# Patient Record
Sex: MALE | Race: WHITE | NOT HISPANIC OR LATINO | Employment: OTHER | URBAN - METROPOLITAN AREA
[De-identification: names, ages, dates, MRNs, and addresses within clinical notes are randomized per-mention and may not be internally consistent; named-entity substitution may affect disease eponyms.]

---

## 2017-05-30 PROBLEM — D53.9 MACROCYTIC ANEMIA: Status: ACTIVE | Noted: 2017-05-30

## 2020-07-19 ENCOUNTER — DOCUMENTATION ONLY (OUTPATIENT)
Dept: TELEMEDICINE | Facility: HOSPITAL | Age: 65
End: 2020-07-19

## 2020-07-19 NOTE — PLAN OF CARE
(Physician in Lead of Transfers)   Outside Transfer Acceptance Note / Regional Referral Center    Transferring Physician: Ashley Vernon NP (GI)    Accepting Physician: Mariluz Moreau MD    Date of Acceptance: 07/19/2020    Transferring Facility: [facility]     Reason for Transfer: Sick patient with decompensated cirrhosis, needs HLOC with hepatology    Report from Transferring Physician/Hospital course: 64M with cirrhosis 2/2 EtOH and Hep C s/p Harvoni (GI: Dr Hernadez), prior EtOH dependence in remission (last drink 10/2019), last EGD 2016 with gastric varices, OA/chronic pain, h/o narcotic abuse on long term methadone 10 BID (per care Everywhere), tobacco smoking, admit to [facility] yesterday afternoon for decompensated cirrhosis and hepatic encephalopathy, and severe KELLI, in setting of diarrhea.      In ED: AMS/encephalopathy, worsening jaundice, Ammonia 212 (normal 19-60), TB 40, BUN 94/Cr 7.18 in patient with no significant renal Hx, K 2.2, INR 1.31, platelets 105, WBC 8.6, H/H ok. Abd U/S with doppler, exhibits normal hepatopedal flow and echogenic liver with nodular contour, no ascites, not enough fluid to tap (GI and radiology discussed). CXR wnl. UA wnl except for bili.  Got 2L IVFs bolus.  Started on lactulose TID, multiple BMs, repeated ammonia today and improved at 209.  GI consult added rifaximin today.  Labs unchanged today. K+ up to 2.8, last Cr improved by 2 points to 5.91. Nephrology and GI consulted.  Diarrhea - Stool Cx and C diff sent and pending.   It is uncertain if the KELLI is due to volume depletion due to profuse diarrhea or hepatorenal syndrome - however, he has already improved after crystalloid IVFs alone which may all be explained by volume depletion    Ongoing diarrhea today - 5 documented stools.  Asked about UOP, and he has had 4 urine episodes (not strict Is/Os).  Just had an episode of vomiting (nonbloody)  MELD-Na 36 (using some labs from today and some from yesterday which  is not ideal albeit we know MELD is high given TB and Cr)    Dr Valles aware of case and will consult.     VS: 139/83, HR 70, RR 17, 99% on RA   PE: +asterixis, arouseable, only oriented x 1    Home meds: see records scanned in to Media tab      Labs: see above    Radiographs: see above    To Do List:   Admit to IML  Consider empiric flagyl for c diff while awaiting outside c diff  Cont empiric CTX    Mariluz Aiken Unknown [phone] (Home) Mother, Emergency Contact       Upon patient arrival to floor, please send SecureChat to [facility] or call extension [phone] (if no answer, this will flip to a beeper, so enter your call back number) for Hospital Medicine admit team assignment and for additional admit orders for the patient.  Do not page the attending physician associated with the patient on arrival (this physician may not be on duty at the time of arrival).  Rather, always call [phone] to reach the triage physician for orders and team assignment.    Mariluz Moreau MD  Hospital Medicine Staff  Cell: [phone]

## 2020-07-20 ENCOUNTER — HOSPITAL ENCOUNTER (INPATIENT)
Facility: HOSPITAL | Age: 65
LOS: 3 days | Discharge: ANOTHER HOSPITAL | DRG: 441 | End: 2020-07-23
Attending: HOSPITALIST | Admitting: HOSPITALIST
Payer: MEDICARE

## 2020-07-20 DIAGNOSIS — Z72.0 TOBACCO ABUSE: ICD-10-CM

## 2020-07-20 DIAGNOSIS — R07.9 CHEST PAIN: ICD-10-CM

## 2020-07-20 DIAGNOSIS — K72.00 ACUTE LIVER FAILURE WITHOUT HEPATIC COMA: ICD-10-CM

## 2020-07-20 DIAGNOSIS — K72.00 ACUTE LIVER FAILURE: ICD-10-CM

## 2020-07-20 DIAGNOSIS — F11.10 NARCOTIC ABUSE: ICD-10-CM

## 2020-07-20 DIAGNOSIS — F10.20 ALCOHOL USE DISORDER, SEVERE, IN CONTROLLED ENVIRONMENT: ICD-10-CM

## 2020-07-20 DIAGNOSIS — N17.0 ACUTE RENAL FAILURE WITH TUBULAR NECROSIS: ICD-10-CM

## 2020-07-20 DIAGNOSIS — B19.20 DECOMPENSATED CIRRHOSIS RELATED TO HEPATITIS C VIRUS (HCV): Primary | ICD-10-CM

## 2020-07-20 DIAGNOSIS — G89.4 CHRONIC PAIN ASSOCIATED WITH SIGNIFICANT PSYCHOSOCIAL DYSFUNCTION: ICD-10-CM

## 2020-07-20 DIAGNOSIS — N17.9 AKI (ACUTE KIDNEY INJURY): ICD-10-CM

## 2020-07-20 DIAGNOSIS — K70.10 ALCOHOLIC HEPATITIS WITHOUT ASCITES: ICD-10-CM

## 2020-07-20 DIAGNOSIS — Z01.818 ENCOUNTER FOR PRE-TRANSPLANT EVALUATION FOR LIVER TRANSPLANT: ICD-10-CM

## 2020-07-20 DIAGNOSIS — K74.69 DECOMPENSATED CIRRHOSIS RELATED TO HEPATITIS C VIRUS (HCV): Primary | ICD-10-CM

## 2020-07-20 DIAGNOSIS — G93.41 ENCEPHALOPATHY, METABOLIC: ICD-10-CM

## 2020-07-20 PROBLEM — E87.20 METABOLIC ACIDOSIS: Status: ACTIVE | Noted: 2020-07-20

## 2020-07-20 PROBLEM — E72.20 HYPERAMMONEMIA: Status: ACTIVE | Noted: 2020-07-18

## 2020-07-20 LAB
ALBUMIN SERPL BCP-MCNC: 1.8 G/DL (ref 3.5–5.2)
ALBUMIN SERPL BCP-MCNC: 1.8 G/DL (ref 3.5–5.2)
ALBUMIN SERPL BCP-MCNC: 2.4 G/DL (ref 3.5–5.2)
ALLENS TEST: ABNORMAL
ALP SERPL-CCNC: 164 U/L (ref 55–135)
ALP SERPL-CCNC: 181 U/L (ref 55–135)
ALT SERPL W/O P-5'-P-CCNC: 33 U/L (ref 10–44)
ALT SERPL W/O P-5'-P-CCNC: 34 U/L (ref 10–44)
ANION GAP SERPL CALC-SCNC: 12 MMOL/L (ref 8–16)
ANION GAP SERPL CALC-SCNC: 12 MMOL/L (ref 8–16)
ANION GAP SERPL CALC-SCNC: 15 MMOL/L (ref 8–16)
AST SERPL-CCNC: 87 U/L (ref 10–40)
AST SERPL-CCNC: 91 U/L (ref 10–40)
BASOPHILS # BLD AUTO: 0.1 K/UL (ref 0–0.2)
BASOPHILS NFR BLD: 0.9 % (ref 0–1.9)
BILIRUB SERPL-MCNC: 33.3 MG/DL (ref 0.1–1)
BILIRUB SERPL-MCNC: 33.8 MG/DL (ref 0.1–1)
BILIRUB UR QL STRIP: ABNORMAL
BUN SERPL-MCNC: 69 MG/DL (ref 8–23)
BUN SERPL-MCNC: 72 MG/DL (ref 8–23)
BUN SERPL-MCNC: 77 MG/DL (ref 8–23)
CALCIUM SERPL-MCNC: 8.4 MG/DL (ref 8.7–10.5)
CALCIUM SERPL-MCNC: 8.7 MG/DL (ref 8.7–10.5)
CALCIUM SERPL-MCNC: 8.8 MG/DL (ref 8.7–10.5)
CHLORIDE SERPL-SCNC: 115 MMOL/L (ref 95–110)
CHLORIDE SERPL-SCNC: 117 MMOL/L (ref 95–110)
CHLORIDE SERPL-SCNC: 118 MMOL/L (ref 95–110)
CLARITY UR REFRACT.AUTO: CLEAR
CO2 SERPL-SCNC: 11 MMOL/L (ref 23–29)
CO2 SERPL-SCNC: 12 MMOL/L (ref 23–29)
CO2 SERPL-SCNC: 12 MMOL/L (ref 23–29)
COLOR UR AUTO: ABNORMAL
CREAT SERPL-MCNC: 4.6 MG/DL (ref 0.5–1.4)
CREAT SERPL-MCNC: 4.7 MG/DL (ref 0.5–1.4)
CREAT SERPL-MCNC: 5 MG/DL (ref 0.5–1.4)
CREAT UR-MCNC: 66 MG/DL (ref 23–375)
CREAT UR-MCNC: 66 MG/DL (ref 23–375)
DELSYS: ABNORMAL
DIFFERENTIAL METHOD: ABNORMAL
EOSINOPHIL # BLD AUTO: 0.1 K/UL (ref 0–0.5)
EOSINOPHIL NFR BLD: 0.5 % (ref 0–8)
EOSINOPHIL URNS QL WRIGHT STN: NORMAL
ERYTHROCYTE [DISTWIDTH] IN BLOOD BY AUTOMATED COUNT: 18.5 % (ref 11.5–14.5)
EST. GFR  (AFRICAN AMERICAN): 13.2 ML/MIN/1.73 M^2
EST. GFR  (AFRICAN AMERICAN): 14.2 ML/MIN/1.73 M^2
EST. GFR  (AFRICAN AMERICAN): 14.6 ML/MIN/1.73 M^2
EST. GFR  (NON AFRICAN AMERICAN): 11.4 ML/MIN/1.73 M^2
EST. GFR  (NON AFRICAN AMERICAN): 12.3 ML/MIN/1.73 M^2
EST. GFR  (NON AFRICAN AMERICAN): 12.6 ML/MIN/1.73 M^2
FIO2: 21
GLUCOSE SERPL-MCNC: 70 MG/DL (ref 70–110)
GLUCOSE SERPL-MCNC: 78 MG/DL (ref 70–110)
GLUCOSE SERPL-MCNC: 82 MG/DL (ref 70–110)
GLUCOSE UR QL STRIP: NEGATIVE
HCO3 UR-SCNC: 10.9 MMOL/L (ref 24–28)
HCT VFR BLD AUTO: 36.3 % (ref 40–54)
HGB BLD-MCNC: 12.3 G/DL (ref 14–18)
HGB UR QL STRIP: NEGATIVE
IMM GRANULOCYTES # BLD AUTO: 0.5 K/UL (ref 0–0.04)
IMM GRANULOCYTES NFR BLD AUTO: 4.6 % (ref 0–0.5)
INR PPP: 1.3 (ref 0.8–1.2)
KETONES UR QL STRIP: NEGATIVE
LEUKOCYTE ESTERASE UR QL STRIP: NEGATIVE
LYMPHOCYTES # BLD AUTO: 0.7 K/UL (ref 1–4.8)
LYMPHOCYTES NFR BLD: 6.3 % (ref 18–48)
MAGNESIUM SERPL-MCNC: 1.8 MG/DL (ref 1.6–2.6)
MCH RBC QN AUTO: 32.4 PG (ref 27–31)
MCHC RBC AUTO-ENTMCNC: 33.9 G/DL (ref 32–36)
MCV RBC AUTO: 96 FL (ref 82–98)
MODE: ABNORMAL
MONOCYTES # BLD AUTO: 0.7 K/UL (ref 0.3–1)
MONOCYTES NFR BLD: 6.4 % (ref 4–15)
NEUTROPHILS # BLD AUTO: 8.7 K/UL (ref 1.8–7.7)
NEUTROPHILS NFR BLD: 81.3 % (ref 38–73)
NITRITE UR QL STRIP: NEGATIVE
NRBC BLD-RTO: 1 /100 WBC
PCO2 BLDA: 21.6 MMHG (ref 35–45)
PH SMN: 7.31 [PH] (ref 7.35–7.45)
PH UR STRIP: 6 [PH] (ref 5–8)
PHOSPHATE SERPL-MCNC: 2.8 MG/DL (ref 2.7–4.5)
PHOSPHATE SERPL-MCNC: 2.9 MG/DL (ref 2.7–4.5)
PLATELET # BLD AUTO: 114 K/UL (ref 150–350)
PMV BLD AUTO: 12.6 FL (ref 9.2–12.9)
PO2 BLDA: 77 MMHG (ref 40–60)
POC BE: -15 MMOL/L
POC SATURATED O2: 94 % (ref 95–100)
POC TCO2: 12 MMOL/L (ref 24–29)
POTASSIUM SERPL-SCNC: 2.8 MMOL/L (ref 3.5–5.1)
POTASSIUM SERPL-SCNC: 2.9 MMOL/L (ref 3.5–5.1)
POTASSIUM SERPL-SCNC: 3 MMOL/L (ref 3.5–5.1)
POTASSIUM SERPL-SCNC: 3.4 MMOL/L (ref 3.5–5.1)
PROT SERPL-MCNC: 6.3 G/DL (ref 6–8.4)
PROT SERPL-MCNC: 6.4 G/DL (ref 6–8.4)
PROT UR QL STRIP: NEGATIVE
PROT UR-MCNC: 13 MG/DL (ref 0–15)
PROT/CREAT UR: 0.2 MG/G{CREAT} (ref 0–0.2)
PROTHROMBIN TIME: 12.7 SEC (ref 9–12.5)
RBC # BLD AUTO: 3.8 M/UL (ref 4.6–6.2)
SAMPLE: ABNORMAL
SITE: ABNORMAL
SODIUM SERPL-SCNC: 141 MMOL/L (ref 136–145)
SODIUM SERPL-SCNC: 141 MMOL/L (ref 136–145)
SODIUM SERPL-SCNC: 142 MMOL/L (ref 136–145)
SODIUM UR-SCNC: 79 MMOL/L (ref 20–250)
SP GR UR STRIP: 1.01 (ref 1–1.03)
URN SPEC COLLECT METH UR: ABNORMAL
UUN UR-MCNC: 396 MG/DL (ref 140–1050)
WBC # BLD AUTO: 10.76 K/UL (ref 3.9–12.7)

## 2020-07-20 PROCEDURE — 36415 COLL VENOUS BLD VENIPUNCTURE: CPT

## 2020-07-20 PROCEDURE — 99223 1ST HOSP IP/OBS HIGH 75: CPT | Mod: GC,,, | Performed by: INTERNAL MEDICINE

## 2020-07-20 PROCEDURE — 25000003 PHARM REV CODE 250: Performed by: INTERNAL MEDICINE

## 2020-07-20 PROCEDURE — 80321 ALCOHOLS BIOMARKERS 1OR 2: CPT

## 2020-07-20 PROCEDURE — 85610 PROTHROMBIN TIME: CPT

## 2020-07-20 PROCEDURE — 84132 ASSAY OF SERUM POTASSIUM: CPT

## 2020-07-20 PROCEDURE — 99223 1ST HOSP IP/OBS HIGH 75: CPT | Mod: ,,, | Performed by: INTERNAL MEDICINE

## 2020-07-20 PROCEDURE — P9047 ALBUMIN (HUMAN), 25%, 50ML: HCPCS | Mod: JG | Performed by: HOSPITALIST

## 2020-07-20 PROCEDURE — 84300 ASSAY OF URINE SODIUM: CPT

## 2020-07-20 PROCEDURE — 25000003 PHARM REV CODE 250: Performed by: HOSPITALIST

## 2020-07-20 PROCEDURE — 63600175 PHARM REV CODE 636 W HCPCS: Mod: JG | Performed by: HOSPITALIST

## 2020-07-20 PROCEDURE — 82803 BLOOD GASES ANY COMBINATION: CPT

## 2020-07-20 PROCEDURE — 80053 COMPREHEN METABOLIC PANEL: CPT

## 2020-07-20 PROCEDURE — 99223 PR INITIAL HOSPITAL CARE,LEVL III: ICD-10-PCS | Mod: ,,, | Performed by: INTERNAL MEDICINE

## 2020-07-20 PROCEDURE — 84540 ASSAY OF URINE/UREA-N: CPT

## 2020-07-20 PROCEDURE — 83735 ASSAY OF MAGNESIUM: CPT

## 2020-07-20 PROCEDURE — 63600175 PHARM REV CODE 636 W HCPCS: Performed by: INTERNAL MEDICINE

## 2020-07-20 PROCEDURE — 20600001 HC STEP DOWN PRIVATE ROOM

## 2020-07-20 PROCEDURE — 87205 SMEAR GRAM STAIN: CPT

## 2020-07-20 PROCEDURE — 36416 COLLJ CAPILLARY BLOOD SPEC: CPT

## 2020-07-20 PROCEDURE — 80069 RENAL FUNCTION PANEL: CPT

## 2020-07-20 PROCEDURE — 84100 ASSAY OF PHOSPHORUS: CPT

## 2020-07-20 PROCEDURE — 81003 URINALYSIS AUTO W/O SCOPE: CPT

## 2020-07-20 PROCEDURE — 80053 COMPREHEN METABOLIC PANEL: CPT | Mod: 91

## 2020-07-20 PROCEDURE — 99223 PR INITIAL HOSPITAL CARE,LEVL III: ICD-10-PCS | Mod: GC,,, | Performed by: INTERNAL MEDICINE

## 2020-07-20 PROCEDURE — 84156 ASSAY OF PROTEIN URINE: CPT

## 2020-07-20 PROCEDURE — 85025 COMPLETE CBC W/AUTO DIFF WBC: CPT

## 2020-07-20 PROCEDURE — 99900035 HC TECH TIME PER 15 MIN (STAT)

## 2020-07-20 RX ORDER — IBUPROFEN 200 MG
24 TABLET ORAL
Status: DISCONTINUED | OUTPATIENT
Start: 2020-07-20 | End: 2020-07-23 | Stop reason: HOSPADM

## 2020-07-20 RX ORDER — FENOFIBRATE 160 MG/1
160 TABLET ORAL DAILY
Status: DISCONTINUED | OUTPATIENT
Start: 2020-07-20 | End: 2020-07-20

## 2020-07-20 RX ORDER — TALC
6 POWDER (GRAM) TOPICAL NIGHTLY PRN
Status: DISCONTINUED | OUTPATIENT
Start: 2020-07-20 | End: 2020-07-23 | Stop reason: HOSPADM

## 2020-07-20 RX ORDER — CEFTRIAXONE 2 G/50ML
2 INJECTION, SOLUTION INTRAVENOUS
Status: DISCONTINUED | OUTPATIENT
Start: 2020-07-20 | End: 2020-07-21

## 2020-07-20 RX ORDER — FOLIC ACID 1 MG/1
1 TABLET ORAL DAILY
Status: DISCONTINUED | OUTPATIENT
Start: 2020-07-21 | End: 2020-07-23 | Stop reason: HOSPADM

## 2020-07-20 RX ORDER — ZINC SULFATE 50(220)MG
220 CAPSULE ORAL DAILY
Status: DISCONTINUED | OUTPATIENT
Start: 2020-07-21 | End: 2020-07-23 | Stop reason: HOSPADM

## 2020-07-20 RX ORDER — ALBUMIN HUMAN 250 G/1000ML
25 SOLUTION INTRAVENOUS 3 TIMES DAILY
Status: COMPLETED | OUTPATIENT
Start: 2020-07-20 | End: 2020-07-20

## 2020-07-20 RX ORDER — LORAZEPAM 1 MG/1
1 TABLET ORAL EVERY 4 HOURS PRN
Status: DISCONTINUED | OUTPATIENT
Start: 2020-07-20 | End: 2020-07-22

## 2020-07-20 RX ORDER — LACTULOSE 10 G/15ML
45 SOLUTION ORAL 3 TIMES DAILY
Status: DISCONTINUED | OUTPATIENT
Start: 2020-07-20 | End: 2020-07-20

## 2020-07-20 RX ORDER — IPRATROPIUM BROMIDE AND ALBUTEROL SULFATE 2.5; .5 MG/3ML; MG/3ML
3 SOLUTION RESPIRATORY (INHALATION) EVERY 6 HOURS PRN
Status: DISCONTINUED | OUTPATIENT
Start: 2020-07-20 | End: 2020-07-23 | Stop reason: HOSPADM

## 2020-07-20 RX ORDER — IBUPROFEN 200 MG
16 TABLET ORAL
Status: DISCONTINUED | OUTPATIENT
Start: 2020-07-20 | End: 2020-07-23 | Stop reason: HOSPADM

## 2020-07-20 RX ORDER — THIAMINE HCL 100 MG
100 TABLET ORAL DAILY
Status: DISCONTINUED | OUTPATIENT
Start: 2020-07-21 | End: 2020-07-22

## 2020-07-20 RX ORDER — LACTULOSE 10 G/15ML
45 SOLUTION ORAL 3 TIMES DAILY
Status: DISCONTINUED | OUTPATIENT
Start: 2020-07-20 | End: 2020-07-21

## 2020-07-20 RX ORDER — ONDANSETRON 8 MG/1
8 TABLET, ORALLY DISINTEGRATING ORAL EVERY 8 HOURS PRN
Status: DISCONTINUED | OUTPATIENT
Start: 2020-07-20 | End: 2020-07-23 | Stop reason: HOSPADM

## 2020-07-20 RX ORDER — GLUCAGON 1 MG
1 KIT INJECTION
Status: DISCONTINUED | OUTPATIENT
Start: 2020-07-20 | End: 2020-07-23 | Stop reason: HOSPADM

## 2020-07-20 RX ORDER — SODIUM CHLORIDE 0.9 % (FLUSH) 0.9 %
10 SYRINGE (ML) INJECTION
Status: DISCONTINUED | OUTPATIENT
Start: 2020-07-20 | End: 2020-07-23 | Stop reason: HOSPADM

## 2020-07-20 RX ORDER — POTASSIUM CHLORIDE 20 MEQ/1
40 TABLET, EXTENDED RELEASE ORAL ONCE
Status: COMPLETED | OUTPATIENT
Start: 2020-07-20 | End: 2020-07-20

## 2020-07-20 RX ORDER — LACTULOSE 10 G/15ML
20 SOLUTION ORAL 3 TIMES DAILY
Status: DISCONTINUED | OUTPATIENT
Start: 2020-07-20 | End: 2020-07-20

## 2020-07-20 RX ORDER — PROMETHAZINE HYDROCHLORIDE 25 MG/1
25 TABLET ORAL EVERY 6 HOURS PRN
Status: DISCONTINUED | OUTPATIENT
Start: 2020-07-20 | End: 2020-07-20

## 2020-07-20 RX ORDER — ACETAMINOPHEN 325 MG/1
650 TABLET ORAL EVERY 8 HOURS PRN
Status: DISCONTINUED | OUTPATIENT
Start: 2020-07-20 | End: 2020-07-23 | Stop reason: HOSPADM

## 2020-07-20 RX ORDER — POLYETHYLENE GLYCOL 3350 17 G/17G
17 POWDER, FOR SOLUTION ORAL 2 TIMES DAILY PRN
Status: DISCONTINUED | OUTPATIENT
Start: 2020-07-20 | End: 2020-07-20

## 2020-07-20 RX ORDER — LACTULOSE 10 G/15ML
200 SOLUTION ORAL; RECTAL ONCE
Status: COMPLETED | OUTPATIENT
Start: 2020-07-20 | End: 2020-07-20

## 2020-07-20 RX ORDER — PROCHLORPERAZINE EDISYLATE 5 MG/ML
5 INJECTION INTRAMUSCULAR; INTRAVENOUS EVERY 6 HOURS PRN
Status: DISCONTINUED | OUTPATIENT
Start: 2020-07-20 | End: 2020-07-23 | Stop reason: HOSPADM

## 2020-07-20 RX ORDER — LACTULOSE 10 G/15ML
30 SOLUTION ORAL 3 TIMES DAILY
Status: DISCONTINUED | OUTPATIENT
Start: 2020-07-20 | End: 2020-07-20

## 2020-07-20 RX ORDER — SPIRONOLACTONE 25 MG/1
50 TABLET ORAL DAILY
Status: DISCONTINUED | OUTPATIENT
Start: 2020-07-20 | End: 2020-07-20

## 2020-07-20 RX ORDER — PANTOPRAZOLE SODIUM 40 MG/1
40 TABLET, DELAYED RELEASE ORAL DAILY
Status: DISCONTINUED | OUTPATIENT
Start: 2020-07-20 | End: 2020-07-23 | Stop reason: HOSPADM

## 2020-07-20 RX ORDER — SODIUM CHLORIDE 9 MG/ML
INJECTION, SOLUTION INTRAVENOUS CONTINUOUS
Status: DISCONTINUED | OUTPATIENT
Start: 2020-07-20 | End: 2020-07-20

## 2020-07-20 RX ORDER — ENOXAPARIN SODIUM 100 MG/ML
40 INJECTION SUBCUTANEOUS EVERY 24 HOURS
Status: DISCONTINUED | OUTPATIENT
Start: 2020-07-20 | End: 2020-07-20

## 2020-07-20 RX ADMIN — POTASSIUM CHLORIDE 40 MEQ: 1500 TABLET, EXTENDED RELEASE ORAL at 11:07

## 2020-07-20 RX ADMIN — ALBUMIN (HUMAN) 25 G: 12.5 SOLUTION INTRAVENOUS at 11:07

## 2020-07-20 RX ADMIN — Medication 6 MG: at 10:07

## 2020-07-20 RX ADMIN — RIFAXIMIN 550 MG: 550 TABLET ORAL at 11:07

## 2020-07-20 RX ADMIN — PANTOPRAZOLE SODIUM 40 MG: 40 TABLET, DELAYED RELEASE ORAL at 11:07

## 2020-07-20 RX ADMIN — LACTULOSE 45 G: 20 SOLUTION ORAL at 10:07

## 2020-07-20 RX ADMIN — CEFTRIAXONE 2 G: 2 INJECTION, SOLUTION INTRAVENOUS at 01:07

## 2020-07-20 RX ADMIN — SODIUM BICARBONATE: 84 INJECTION, SOLUTION INTRAVENOUS at 11:07

## 2020-07-20 RX ADMIN — SODIUM CHLORIDE: 0.9 INJECTION, SOLUTION INTRAVENOUS at 01:07

## 2020-07-20 RX ADMIN — ALBUMIN (HUMAN) 25 G: 12.5 SOLUTION INTRAVENOUS at 03:07

## 2020-07-20 RX ADMIN — ALBUMIN (HUMAN) 25 G: 12.5 SOLUTION INTRAVENOUS at 09:07

## 2020-07-20 RX ADMIN — LACTULOSE 200 G: 10 SOLUTION ORAL at 11:07

## 2020-07-20 RX ADMIN — RIFAXIMIN 550 MG: 550 TABLET ORAL at 09:07

## 2020-07-20 NOTE — PLAN OF CARE
07/20/20 1247   Discharge Assessment   Assessment Type Discharge Planning Assessment   Confirmed/corrected address and phone number on facesheet? Yes   Assessment information obtained from? Caregiver   Prior to hospitilization cognitive status: Alert/Oriented   Prior to hospitalization functional status: Needs Assistance   Current cognitive status: Alert/Oriented   Current Functional Status: Assistive Equipment;Needs Assistance   Lives With alone   Able to Return to Prior Arrangements yes   Is patient able to care for self after discharge? Unable to determine at this time (comments)   Who are your caregiver(s) and their phone number(s)? Mariluz Aiken ([phone])   Readmission Within the Last 30 Days no previous admission in last 30 days   Patient currently being followed by outpatient case management? No   Patient currently receives any other outside agency services? No   Equipment Currently Used at Home cane, straight   Do you have any problems affording any of your prescribed medications? No   Is the patient taking medications as prescribed?   (TEJAS Patient confused)   Does the patient have transportation home? Yes   Transportation Anticipated family or friend will provide   Does the patient receive services at the [facility]? No   Discharge Plan A Home with family;Home Health   Discharge Plan B Rehab   DME Needed Upon Discharge  other (see comments)  (TBD)     PCP Winsome Zarate MD  [address] [place] [address]    [facility] - [place], [place] - [address]  [address] [address]  Phone: [phone] Fax: [phone]    [facility] - [place], [place] - [address]  [address] [address]  Phone: [phone] Fax: [phone]     F/U PCP- Left message with YAW Floyd to call to schedule appointment. Left my name and number. Will continue to follow.    Rocael Brown RN BSN   [id number]      Payor: [employer] MANAGED MEDICARE /  Plan: [employer] MEDICARE HMO / Product Type: Capitation /

## 2020-07-20 NOTE — ASSESSMENT & PLAN NOTE
Metabolic acidosis  Hypokalemia  Hypophosphatemia  Hypomagnesemia  This patient presented with hepatic encephalopathy and 4 days of diarrhea. Also presumably poor oral intake at home considering current debility while inpatient, lives at home alone. UA bland aside from high bilirubin. Has been hemodynamically stable (hypertensive) and renal function improving with IV fluids, making HRS very unlikely. This all points towards a pre-renal etiology. Continued to be acidotic with bicarb 11, pH 7.31. Retroperitoneal U/S WNL. Patient has multiple reasons to have an anion gap (corrected 20.5) metabolic acidosis. Uremia from renal dysfunction and lactic acid accumulation from liver dysfunction can contribute to the gap. Acidosis is further worsened from GI losses. After 1 day of IV bicarb, then D5/1/2NS with KCL 40 mEq and sodium bicarb 150 mEq, patient's acidosis has improved with bicarb 18.    - Stop D5/1/2NS with KCL 40 mEq and sodium bicarb 150 mEq and replace with NS.  - Continue electrolyte replacement with labs q6 until they have improved.  - Strict ins and outs. Agree with placing Gonzalez catheter to more accurately measure ins and outs. Had good UOP with 1.7L on 7/20  - Urine microscopy on 7/21 showed scant hyaline casts but overall bland. This along with his rapid improvement in renal function with fluids is consistent with a prerenal etiology, not ATN.   - The patient does not have any current indications for urgent HD, especially now that acidosis has improved  - Avoid nephrotoxic agents  - Renally dose medications to current CrCl

## 2020-07-20 NOTE — PROGRESS NOTES
To [facility] via stretcher with transport from [facility].  Report received earlier in day.  Flex seal in place - will place lda for it.  Soiled upon transfer and cleaned up.  Noted breakdown to R lower buttocks - lda placed in epic and wound care consulted.  NA Bicarb @ 75cc/hr to right piv.  3 bm charted thus far in shift - lactulose for 3pm held.  Left piv with albumin started.  Orders for issa placement.  PT/OT consulted.  Waffle mattress in place.  visi in use.  AAOx3 although at time off.  Bed in low and locked position.  Nonskid socks and teds/scd in use.  Bed alarm in use.

## 2020-07-20 NOTE — SUBJECTIVE & OBJECTIVE
Past Medical History:   Diagnosis Date    Anxiety     Hepatitis C     Hypertension     Testosterone deficiency        Past Surgical History:   Procedure Laterality Date    ear drum replaced      fisure      rgd         Review of patient's allergies indicates:  No Known Allergies  Current Facility-Administered Medications   Medication Frequency    acetaminophen tablet 650 mg Q8H PRN    albumin human 25% bottle 25 g TID    albuterol-ipratropium 2.5 mg-0.5 mg/3 mL nebulizer solution 3 mL Q6H PRN    cefTRIAXone 2 gram/50 mL IVPB 2 g Q24H    [START ON 7/21/2020] dextrose 5 % and 0.45 % NaCl 1,000 mL with potassium chloride 40 mEq, sodium bicarbonate 150 mEq infusion Continuous    dextrose 50% injection 12.5 g PRN    dextrose 50% injection 25 g PRN    glucagon (human recombinant) injection 1 mg PRN    glucose chewable tablet 16 g PRN    glucose chewable tablet 24 g PRN    lactulose 20 gram/30 mL solution Soln 45 g TID    LORazepam tablet 1 mg Q4H PRN    melatonin tablet 6 mg Nightly PRN    ondansetron disintegrating tablet 8 mg Q8H PRN    pantoprazole EC tablet 40 mg Daily    prochlorperazine injection Soln 5 mg Q6H PRN    rifAXIMin tablet 550 mg BID    sodium bicarbonate 150 mEq in dextrose 5 % 1,000 mL infusion Continuous    sodium chloride 0.9% flush 10 mL PRN     Family History     Problem Relation (Age of Onset)    Arthritis Mother, Father    Diabetes Father    Hyperlipidemia Father    Hypertension Father    Stroke Father        Tobacco Use    Smoking status: Current Every Day Smoker     Types: Cigarettes    Smokeless tobacco: Never Used   Substance and Sexual Activity    Alcohol use: Yes    Drug use: No    Sexual activity: Not on file     Review of Systems   Constitutional: Positive for fatigue. Negative for chills and fever.   HENT: Negative for congestion, ear pain, sinus pain and sore throat.    Eyes: Negative for visual disturbance.   Respiratory: Negative for cough and shortness  of breath.    Cardiovascular: Negative for chest pain and leg swelling.   Gastrointestinal: Positive for diarrhea. Negative for abdominal pain, constipation, nausea and vomiting.   Genitourinary: Negative for decreased urine volume, difficulty urinating and dysuria.   Musculoskeletal: Negative for back pain and neck pain.   Skin: Positive for color change. Negative for rash.   Neurological: Negative for dizziness, syncope and headaches.   Psychiatric/Behavioral: Negative for confusion and decreased concentration.     Objective:     Vital Signs (Most Recent):  Temp: 98.3 °F (36.8 °C) (07/20/20 1140)  Pulse: 83 (07/20/20 1537)  Resp: 18 (07/20/20 1537)  BP: (!) 159/83 (07/20/20 1537)  SpO2: 98 % (07/20/20 1537)  O2 Device (Oxygen Therapy): room air (07/20/20 1537) Vital Signs (24h Range):  Temp:  [96.1 °F (35.6 °C)-98.3 °F (36.8 °C)] 98.3 °F (36.8 °C)  Pulse:  [73-90] 83  Resp:  [18-22] 18  SpO2:  [97 %-99 %] 98 %  BP: (131-173)/(77-90) 159/83     Weight: 94.6 kg (208 lb 8.9 oz) (07/20/20 0154)  Body mass index is 31.71 kg/m².  Body surface area is 2.13 meters squared.    I/O last 3 completed shifts:  In: 50 [IV Piggyback:50]  Out: -     Physical Exam  Vitals signs reviewed.   Constitutional:       General: He is not in acute distress.     Appearance: He is well-developed. He is not diaphoretic.   HENT:      Head: Normocephalic and atraumatic.      Mouth/Throat:      Mouth: Mucous membranes are dry.   Eyes:      General: Scleral icterus present.      Conjunctiva/sclera: Conjunctivae normal.   Neck:      Musculoskeletal: Normal range of motion.      Trachea: No tracheal deviation.      Comments: No JVD.  Cardiovascular:      Rate and Rhythm: Normal rate and regular rhythm.      Heart sounds: Normal heart sounds. No murmur.   Pulmonary:      Effort: Pulmonary effort is normal. No respiratory distress.      Breath sounds: Normal breath sounds. No wheezing or rales.   Abdominal:      General: Bowel sounds are normal.  There is distension.      Palpations: Abdomen is soft.      Tenderness: There is no abdominal tenderness.   Musculoskeletal:         General: No deformity.      Comments: Trace pretibial pitting edema bilaterally.   Skin:     General: Skin is warm and dry.      Coloration: Skin is jaundiced.      Findings: No erythema or rash.      Comments: Venous stasis dermatitis in bilateral lower extremities.   Neurological:      Mental Status: He is alert.      Cranial Nerves: No cranial nerve deficit.      Sensory: No sensory deficit.      Motor: No abnormal muscle tone.      Comments: Oriented to self, place, situation, and month/year however not aware of current date. Delayed responses to questions and at times requires the question to be asked multiple times before a reply is given. Bilateral asterixis is present.         Significant Labs:  CMP:   Recent Labs   Lab 07/20/20  0146 07/20/20  0953   GLU 78 70   CALCIUM 8.4* 8.7   ALBUMIN 1.8* 1.8*   PROT 6.3  --     142   K 2.8*  2.9* 3.0*   CO2 11* 12*   * 118*   BUN 77* 72*   CREATININE 5.0* 4.7*   ALKPHOS 181*  --    ALT 34  --    AST 91*  --    BILITOT 33.8*  --      Recent Labs   Lab 07/20/20  1241   COLORU Chelsy   SPECGRAV 1.010   PHUR 6.0   PROTEINUA Negative   NITRITE Negative   LEUKOCYTESUR Negative     All labs within the past 24 hours have been reviewed.    Significant Imaging:  Labs: Reviewed  US: Reviewed

## 2020-07-20 NOTE — ASSESSMENT & PLAN NOTE
Metabolic acidosis  This patient presented with hepatic encephalopathy and 4 days of diarrhea. Also presumably poor oral intake at home considering current debility while inpatient, lives at home alone. UA bland aside from high bilirubin. Has been hemodynamically stable (hypertensive) and renal function improving with IV fluids, making HRS very unlikely. This all points towards a pre-renal etiology, with possible ATN. Continued to be acidotic with bicarb 11, pH 7.31. Retroperitoneal U/S WNL. Patient has multiple reasons to have an anion gap (corrected 20.5) metabolic acidosis. Uremia from renal dysfunction and lactic acid accumulation from liver dysfunction can contribute to the gap. Acidosis is further worsened from GI losses.    - Agree with replacing bicarbonate IV, however would recommend switching to D5/1/2NS with KCL 40 mEq and sodium bicarb 150 mEq. As we administer more bicarb the potassium will shift intracellularly, and his hypokalemia will worsen. Would recommend placing the patient on cardiac telemetry at least for he next day while K is being replaced. Also D5/1/2NS will be ideal for volume repletion.  - Would recommend checking BMPs q4 until bicarb and K stabilize  - Strict ins and outs. Would also recommend placing Gonzalez catheter at least for the next day to more accurately measure ins and outs as they are not currently being measured  - The patient does not have any current indications for urgent HD, however if he continues to be acidotic then we will consider this.  - Will follow up urine lytes  - Will spin urine  - Avoid nephrotoxic agents  - Renally dose medications to current CrCl

## 2020-07-20 NOTE — HPI
Nicholas Aiken is a 64 year old man with alcoholic and HCV cirrhosis s/p Harvoni, alcohol abuse (last drink per mother 3 weeks ago), gastric varices, history of narcotic abuse on methadone, tobacco use, OA, HTN, anxiety, and hypogonadism who presented as a transfer from the [facility] ED in [place], [place] for diarrhea, worsening hepatic encephalopathy, jaundice, and KELLI. History is obtained mostly from chart review as patient is not a good historian. Patient does not have a history of CKD and Cr baseline is 0.9-1.2. On arrival to OSH ED, labs were significant for BUN 94, Cr 7.18, bicarb 15, T bili 40, ammonia 212, K 2.2. U/S did not show significant ascites. He was given 2L IVF, lactulose, and rifaximin. Repeat Cr after fluids improved to 5.91. Continued to have multiple BMs and an episode of nonbloody emesis. MELD remained in the 30s and patient was transferred to [facility] for a higher level of care/hepatology. On arrival to C he received another 1L IVF and was started on ceftriaxone for SBP prophylaxis and albumin 25 mg TID. Continued to hold home lisinopril, spironolactone, and propranolol due to KELLI and suspected hypovolemia. Bicarb continued to drop to 11. Nephrology was consulted for KELLI.

## 2020-07-20 NOTE — CONSULTS
[facility]  Hepatology  Consult note    Patient Name: Nicholas Aiken  MRN: [id number]  Admission Date: 7/20/2020  Hospital Length of Stay: 0 days  Code Status: Full Code   Attending Provider: Jesus Thacker MD   Consulting Provider: Deidre Al MD  Primary Care Physician: Primary Doctor No  Principal Problem:Acute liver failure    Inpatient consult to Hepatology  Consult performed by: Deidre Al MD  Consult ordered by: Max Nix MD  Reason for consult: Decompensated liver cirrhosis        Subjective:     HPI: Nicholas Aiken is a 64 y.o. male with history of HCV/EtOH cirrhosis s/p Harvoni tx w/ SVR, chronic pain on methadone, who is admitted as a transfer from [facility] for AMS and KELLI. History was obtained from the patient, his mother at bedside and chart review. The patient is confused and provides minimal history. He was initially admitted for confusion and weakness to [place] where he was found to have hyperbilirubinemia, KELLI with Cr 7, hypokalemia. He was started on treatment with lactulose and was given IVF. U/S showed cirrhosis but otherwise unremarkable. Due to minimal improvement in patient's clinical status he was [place] [facility]     Upon arrival, labs were significant for hypokalemia (2.8), improving KELLI with Cr 4.7, Tbili 33.8 (improving from 40 at OSH). He was confused. Vital signs stable. Hepatology was consulted for     Per the mother, the patient is currently unemployed, lives on his own, but she lives down the street from his house. He has an ex-wife and a son with whom he is not in contact. She reports that he was told that he needs to stop drinking alcohol because his doctor would stop his testosterone supplements if he doesn't and he has been reportedly sober for 3 months. He has a hx of alcohol use for years. Attempted AA once but was unsuccessful. No hx of DUIs.        Past Medical History:   Diagnosis Date    Anxiety     Hepatitis C      Hypertension     Testosterone deficiency        Past Surgical History:   Procedure Laterality Date    ear drum replaced      fisure      rgd         Family History   Problem Relation Age of Onset    Arthritis Mother     Diabetes Father     Hypertension Father     Arthritis Father     Hyperlipidemia Father     Stroke Father        Social History     Socioeconomic History    Marital status: Single     Spouse name: Not on file    Number of children: Not on file    Years of education: Not on file    Highest education level: Not on file   Occupational History    Not on file   Social Needs    Financial resource strain: Not on file    Food insecurity     Worry: Not on file     Inability: Not on file    Transportation needs     Medical: Not on file     Non-medical: Not on file   Tobacco Use    Smoking status: Current Every Day Smoker     Types: Cigarettes    Smokeless tobacco: Never Used   Substance and Sexual Activity    Alcohol use: Yes    Drug use: No    Sexual activity: Not on file   Lifestyle    Physical activity     Days per week: Not on file     Minutes per session: Not on file    Stress: Not on file   Relationships    Social connections     Talks on phone: Not on file     Gets together: Not on file     Attends Yarsani service: Not on file     Active member of club or organization: Not on file     Attends meetings of clubs or organizations: Not on file     Relationship status: Not on file   Other Topics Concern    Not on file   Social History Narrative    Not on file       No current facility-administered medications on file prior to encounter.      Current Outpatient Medications on File Prior to Encounter   Medication Sig Dispense Refill    baclofen (LIORESAL) 20 MG tablet Take 20 mg by mouth 3 (three) times daily. BACLOFEN 20 MG TABS      docusate sodium (COLACE) 100 MG capsule Take 200 mg by mouth as directed.       doxepin (SINEQUAN) 10 MG capsule Take 10 mg by mouth every  evening.      fenofibrate 160 MG Tab TAKE 1 TABLET EVERY DAY 90 tablet 1    hydrocodone-acetaminophen 10-325mg (NORCO)  mg Tab HYDROCODONE-ACETAMINOPHEN  MG TABS      lisinopril (PRINIVIL,ZESTRIL) 40 MG tablet TAKE 1 TABLET EVERY DAY 90 tablet 1    melatonin 3 mg Tab Take 3 mg by mouth once daily. [facility] MELATONIN 3 MG TABS      methadone (DOLOPHINE) 10 MG tablet Take 10 mg by mouth 2 (two) times a day. METHADONE HCL 10 MG TABS      niacin (NIASPAN) 750 mg TbSR Take 750 mg by mouth as directed.       pantoprazole (PROTONIX) 40 MG tablet Take 1 tablet (40 mg total) by mouth once daily. 30 tablet 0    potassium 99 mg Tab Take 99 mg by mouth once daily.      propranolol (INDERAL) 20 MG tablet TAKE 1 TABLET EVERY 8 HOURS AS DIRECTED  270 tablet 1    propranolol (INDERAL) 20 MG tablet TAKE 1 TABLET EVERY 8 HOURS AS DIRECTED 270 tablet 0    propranolol (INDERAL) 20 MG tablet TAKE 1 TABLET EVERY 8 HOURS AS DIRECTED 270 tablet 0    spironolactone (ALDACTONE) 50 MG tablet TAKE 1 TABLET EVERY DAY 90 tablet 1    testosterone cypionate (DEPOTESTOTERONE CYPIONATE) 200 mg/mL injection Inject into the muscle every 14 (fourteen) days. TESTOSTERONE CYPIONATE 200 MG/ML SOLN         Review of patient's allergies indicates:  No Known Allergies    ROS   Limited due to patient's AMS    Objective:     Vitals:    07/20/20 1140   BP: (!) 166/84   Pulse: 76   Resp: 18   Temp: 98.3 °F (36.8 °C)         Physical Exam   Constitutional:  not in acute distress and ill appearing, but non-toxic  HENT: Head: Normocephalic, no lesions, without obvious abnormality.  Eyes: sclera icteric  Cardiovascular: regular rate and rhythm and no murmur  Respiratory: normal chest expansion & respiratory effort   and no accessory muscle use  GI: distended abdomen but soft and nontender, hyperactive bowel sounds  Musculoskeletal: muscular atrophy noted  Skin: --- POSITIVES: jaundice, spider angiomas on the chest  Neurological: alert, oriented  to self and place, tremor and asterixis noted  Psychiatric: confused        Significant Labs:  Recent Labs   Lab 07/20/20  0442   HGB 12.3*       Lab Results   Component Value Date    WBC 10.76 07/20/2020    HGB 12.3 (L) 07/20/2020    HCT 36.3 (L) 07/20/2020    MCV 96 07/20/2020     (L) 07/20/2020       Lab Results   Component Value Date     07/20/2020    K 3.0 (L) 07/20/2020     (H) 07/20/2020    CO2 12 (L) 07/20/2020    BUN 72 (H) 07/20/2020    CREATININE 4.7 (H) 07/20/2020    CALCIUM 8.7 07/20/2020    ANIONGAP 12 07/20/2020    ESTGFRAFRICA 14.2 (A) 07/20/2020    EGFRNONAA 12.3 (A) 07/20/2020       Lab Results   Component Value Date    ALT 34 07/20/2020    AST 91 (H) 07/20/2020    ALKPHOS 181 (H) 07/20/2020    BILITOT 33.8 (H) 07/20/2020       Lab Results   Component Value Date    INR 1.3 (H) 07/20/2020    INR 1.2 10/15/2019    INR 1.0 03/28/2019           Significant Imaging:  Reviewed pertinent radiology findings.       Assessment/Plan:       MELD-Na score: 36 at 7/20/2020  9:53 AM  MELD score: 36 at 7/20/2020  9:53 AM  Calculated from:  Serum Creatinine: 4.7 mg/dL (Rounded to 4 mg/dL) at 7/20/2020  9:53 AM  Serum Sodium: 142 mmol/L (Rounded to 137 mmol/L) at 7/20/2020  9:53 AM  Total Bilirubin: 33.8 mg/dL at 7/20/2020  1:46 AM  INR(ratio): 1.3 at 7/20/2020  1:46 AM  Age: 64 years 11 months    Problem List:  1. Decompensated EtOH/HCV liver cirrhosis    Presumed etiology:  EtOH/HCV   MELD 36, Child Class C   Decompensated with HE. Decompensating etiology is likely ongoing EtOH.  - Portal hypertension: as evidenced by thrombocytopenia, hypersplenism, BLE edema, varices  - Ascites: none seen on U/S.   - Esophageal varices: last EGD 2016 with gastric varices  - Hepatic encephalopathy: lactulose titrated to 3-4 BMs daily, rifaximin  - HCC screening: No evidence of masses on U/S  - Vaccination status: check HAV total Ab, and HbsAb if neg, will need HAV/HBV vaccine outpatient.   - Transplant: need  addiction psychiatry eval once patient is more lucid    2. KELLI  Patient's Cr improved with fluid resuscitation (from 7 to 4.7 today) making HRS less likely  The patient has anion gap metabolic acidosis as well as severe hypokalemia. Consider complex metabolic disorder.      Plan:  1. Continue treatment with lactulose and rifaximin for HE. Titrate to 3-4 BMs a day.   2. Obtain U/S abdomen to r/o ascites. If ascites present will need paracentesis  3. PETH pending  4. Upon improvement of HE, recommend addiction psychiatry eval to aid in transplant candidacy eval  5. Please obtain urine tox screen  6. Agree with nephrology consult for evaluation of KELLI  7. Please obtain hepatitis serologies: HCV PCR, HBsAg, HBsAb, HAV IgM and IgG    Please obtain daily CBC, BMP, LFT, INR  Thank you for involving us in the care of Nicholas Aiken. Please call with any additional questions, concerns or changes in the patient's clinical status.    Deidre Al MD  Gastroenterology Fellow PGY IV   [facility]

## 2020-07-20 NOTE — H&P
"Hospital Medicine  History and Physical Exam       Team: [facility] HOSP MED GAGE Nix MD  Admit Date: 7/20/2020  Principal Problem:  Acute liver failure   Patient information was obtained from patient, past medical records and ER records.   Primary care Physician: Primary Doctor No  Code status: Full Code    HPI:   Nicholas Aiken is a/an 64 y.o. male with Hep C, previous alcohol and narcotic abuse, cirrhosis 2/2 EtOH and Hep C s/p Harvoni, gastric varices admitted for hepatic encephalopathy and KELLI in setting of diarrhea. He is orient to self, but otherwise history unreliable from patient.     Per  note, " 64M with cirrhosis 2/2 EtOH and Hep C s/p Harvoni (GI: Dr Hernadez), prior EtOH dependence in remission (last drink 10/2019), last EGD 2016 with gastric varices, OA/chronic pain, h/o narcotic abuse on long term methadone 10 BID (per care Everywhere), tobacco smoking, admit to [facility] yesterday afternoon for decompensated cirrhosis and hepatic encephalopathy, and severe KELLI, in setting of diarrhea.       In ED: AMS/encephalopathy, worsening jaundice, Ammonia 212 (normal 19-60), TB 40, BUN 94/Cr 7.18 in patient with no significant renal Hx, K 2.2, INR 1.31, platelets 105, WBC 8.6, H/H ok. Abd U/S with doppler, exhibits normal hepatopedal flow and echogenic liver with nodular contour, no ascites, not enough fluid to tap (GI and radiology discussed). CXR wnl. UA wnl except for bili.  Got 2L IVFs bolus.  Started on lactulose TID, multiple BMs, repeated ammonia today and improved at 209.  GI consult added rifaximin today.  Labs unchanged today. K+ up to 2.8, last Cr improved by 2 points to 5.91. Nephrology and GI consulted.  Diarrhea - Stool Cx and C diff sent and pending.   It is uncertain if the KELLI is due to volume depletion due to profuse diarrhea or hepatorenal syndrome - however, he has already improved after crystalloid IVFs alone which may all be explained by volume depletion     Ongoing diarrhea today - 5 " "documented stools.  Asked about UOP, and he has had 4 urine episodes (not strict Is/Os).  Just had an episode of vomiting (nonbloody)  MELD-Na 36 (using some labs from today and some from yesterday which is not ideal albeit we know MELD is high given TB and Cr)"    Patient had C. Diff checked at OSH, however not performed as formed stool and thus did not meet criteria for testing       Past Medical History: Patient has a past medical history of Anxiety, Hepatitis C, Hypertension, and Testosterone deficiency.    Past Surgical History: Patient has a past surgical history that includes fisure; ear drum replaced; and rgd.    Social History: Patient reports that he has been smoking cigarettes. He has never used smokeless tobacco. He reports current alcohol use. He reports that he does not use drugs.    Family History: family history includes Arthritis in his father and mother; Diabetes in his father; Hyperlipidemia in his father; Hypertension in his father; Stroke in his father.    Medications: reviewed     Allergies: Patient has No Known Allergies.    ROS  Constitutional: no fever or chills  Respiratory: no cough or shortness of breath  Cardiovascular: no chest pain or palpitations  Gastrointestinal: no nausea or vomiting, no abdominal pain  Genitourinary: no hematuria or dysuria  Integument/Breast: no rash or pruritis  Hematologic/Lymphatic: no easy bruising or lymphadenopathy  Musculoskeletal: no arthralgias or myalgias  Neurological: no seizures or tremors  Behavioral/Psych: no depression or anxiety    PEx  Temp:  [97.7 °F (36.5 °C)-97.8 °F (36.6 °C)]   Pulse:  [73-90]   Resp:  [18-22]   BP: (131-156)/(77-82)   SpO2:  [98 %-99 %]   Body mass index is 32.01 kg/m².   No intake or output data in the 24 hours ending 07/20/20 0102    General appearance: no distress, jaundiced but laying in bed  Mental status: Alert and oriented x 3, occasionally slow to answer or confused by question  HEENT:  conjunctivae/corneas clear, " PERRL  Neck: supple, thyroid not enlarged  Pulm:   normal respiratory effort, CTA B, no c/w/r  Card: RRR, S1, S2 normal, no murmur, click, rub or gallop  Abd: soft, NT, ND, BS present; no masses, no organomegaly  Ext: no c/c/e  Pulses: 2+, symmetric  Skin: jaundiced, bilateral stasis and dry skin  Neuro: CN II-XII grossly intact, no focal numbness or weakness, normal strength and tone, asterixis bilaterally    Labs reviewed from outside hospital     No results found for this or any previous visit (from the past 24 hour(s)).    No results for input(s): POCTGLUCOSE in the last 168 hours.    Active Hospital Problems    Diagnosis  POA    *Acute liver failure [K72.00]  Yes    Encephalopathy, metabolic [G93.41]  Yes    Narcotic abuse [F11.10]  Yes    Hyperammonemia [E72.20]  Yes    Acute renal failure [N17.9]  Yes    Tobacco abuse [Z72.0]  Yes    Dyslipidemia [E78.5]  Yes    Cirrhosis [K74.60]  Yes    Hyperlipidemia [E78.5]  Yes    Carrier of viral hepatitis C [B18.2]  Not Applicable      Resolved Hospital Problems   No resolved problems to display.       Assessment and Plan:  Decompensated cirrhosis  Metabolic encephalopathy  Hep C s/p harvoni  Alcohol abuse, in remission  - alcohol and hep C history, gastric varices  - MELD-Na per report 36  - continue treatment with CTX, lactulose, rifaximin  - hepatology consult  - ammonia level increased at 212  - patient reports not drinking for at least 1 year    Hypokalemia  - 2.8 before leaving outside hospital will repeat now and trend    KELLI  - likely from volume depletion from diarrhea  - will give 1L over 10 hours  - monitor for resolution, or consider possibly HRS    Narcotic abuse  Tobacco abuse history  - hold of on starting methadone, as inconsistent documentation and patient does not know, will try to get pharmacy involved in the AM to find out actual dosages, if withdrawal symptoms occur would start atleast 10mg of methadone    Anemia  Thrombocytopenia  -  continue to monitor       DVT PPx: thrombocytopenia    Max Nix MD  Hospital Medicine Staff  07/20/2020

## 2020-07-21 PROBLEM — D69.6 THROMBOCYTOPENIA: Status: ACTIVE | Noted: 2020-07-21

## 2020-07-21 PROBLEM — F10.20 ALCOHOL USE DISORDER, SEVERE, IN CONTROLLED ENVIRONMENT: Status: ACTIVE | Noted: 2020-07-21

## 2020-07-21 PROBLEM — K70.10 ALCOHOLIC HEPATITIS WITHOUT ASCITES: Status: ACTIVE | Noted: 2020-07-21

## 2020-07-21 PROBLEM — N17.0 ATN (ACUTE TUBULAR NECROSIS): Status: ACTIVE | Noted: 2020-07-21

## 2020-07-21 PROBLEM — E44.0 MODERATE MALNUTRITION: Status: ACTIVE | Noted: 2020-07-21

## 2020-07-21 PROBLEM — E87.20 METABOLIC ACIDEMIA: Status: ACTIVE | Noted: 2020-07-21

## 2020-07-21 PROBLEM — Z01.818 ENCOUNTER FOR PRE-TRANSPLANT EVALUATION FOR LIVER TRANSPLANT: Status: ACTIVE | Noted: 2020-07-21

## 2020-07-21 LAB
ALBUMIN SERPL BCP-MCNC: 2.5 G/DL (ref 3.5–5.2)
ALP SERPL-CCNC: 146 U/L (ref 55–135)
ALT SERPL W/O P-5'-P-CCNC: 28 U/L (ref 10–44)
AMPHET+METHAMPHET UR QL: NEGATIVE
ANION GAP SERPL CALC-SCNC: 12 MMOL/L (ref 8–16)
AST SERPL-CCNC: 68 U/L (ref 10–40)
BARBITURATES UR QL SCN>200 NG/ML: NEGATIVE
BASOPHILS # BLD AUTO: 0.09 K/UL (ref 0–0.2)
BASOPHILS NFR BLD: 0.9 % (ref 0–1.9)
BENZODIAZ UR QL SCN>200 NG/ML: NEGATIVE
BILIRUB SERPL-MCNC: 31.4 MG/DL (ref 0.1–1)
BUN SERPL-MCNC: 61 MG/DL (ref 8–23)
BZE UR QL SCN: NEGATIVE
CALCIUM SERPL-MCNC: 9.2 MG/DL (ref 8.7–10.5)
CANNABINOIDS UR QL SCN: NEGATIVE
CHLORIDE SERPL-SCNC: 117 MMOL/L (ref 95–110)
CO2 SERPL-SCNC: 18 MMOL/L (ref 23–29)
CREAT SERPL-MCNC: 4.4 MG/DL (ref 0.5–1.4)
CREAT UR-MCNC: 48 MG/DL (ref 23–375)
DIFFERENTIAL METHOD: ABNORMAL
EOSINOPHIL # BLD AUTO: 0.1 K/UL (ref 0–0.5)
EOSINOPHIL NFR BLD: 0.7 % (ref 0–8)
ERYTHROCYTE [DISTWIDTH] IN BLOOD BY AUTOMATED COUNT: 18.6 % (ref 11.5–14.5)
EST. GFR  (AFRICAN AMERICAN): 15.4 ML/MIN/1.73 M^2
EST. GFR  (NON AFRICAN AMERICAN): 13.3 ML/MIN/1.73 M^2
ETHANOL UR-MCNC: <10 MG/DL
GLUCOSE SERPL-MCNC: 89 MG/DL (ref 70–110)
HAV IGM SERPL QL IA: NEGATIVE
HCT VFR BLD AUTO: 32.7 % (ref 40–54)
HGB BLD-MCNC: 11 G/DL (ref 14–18)
IMM GRANULOCYTES # BLD AUTO: 0.32 K/UL (ref 0–0.04)
IMM GRANULOCYTES NFR BLD AUTO: 3.1 % (ref 0–0.5)
INR PPP: 1.3 (ref 0.8–1.2)
LACTATE SERPL-SCNC: 0.8 MMOL/L (ref 0.5–2.2)
LYMPHOCYTES # BLD AUTO: 0.7 K/UL (ref 1–4.8)
LYMPHOCYTES NFR BLD: 6.9 % (ref 18–48)
MAGNESIUM SERPL-MCNC: 1.5 MG/DL (ref 1.6–2.6)
MCH RBC QN AUTO: 32.6 PG (ref 27–31)
MCHC RBC AUTO-ENTMCNC: 33.6 G/DL (ref 32–36)
MCV RBC AUTO: 97 FL (ref 82–98)
METHADONE UR QL SCN>300 NG/ML: NEGATIVE
MONOCYTES # BLD AUTO: 0.7 K/UL (ref 0.3–1)
MONOCYTES NFR BLD: 6.7 % (ref 4–15)
NEUTROPHILS # BLD AUTO: 8.4 K/UL (ref 1.8–7.7)
NEUTROPHILS NFR BLD: 81.7 % (ref 38–73)
NRBC BLD-RTO: 1 /100 WBC
OPIATES UR QL SCN: NEGATIVE
PCP UR QL SCN>25 NG/ML: NEGATIVE
PHOSPHATE SERPL-MCNC: 1.6 MG/DL (ref 2.7–4.5)
PLATELET # BLD AUTO: 100 K/UL (ref 150–350)
PMV BLD AUTO: ABNORMAL FL (ref 9.2–12.9)
POTASSIUM SERPL-SCNC: 2.8 MMOL/L (ref 3.5–5.1)
PREALB SERPL-MCNC: 8 MG/DL (ref 20–43)
PROT SERPL-MCNC: 6.2 G/DL (ref 6–8.4)
PROTHROMBIN TIME: 13.8 SEC (ref 9–12.5)
RBC # BLD AUTO: 3.37 M/UL (ref 4.6–6.2)
SODIUM SERPL-SCNC: 147 MMOL/L (ref 136–145)
TOXICOLOGY INFORMATION: NORMAL
WBC # BLD AUTO: 10.29 K/UL (ref 3.9–12.7)

## 2020-07-21 PROCEDURE — 97530 THERAPEUTIC ACTIVITIES: CPT

## 2020-07-21 PROCEDURE — 85610 PROTHROMBIN TIME: CPT

## 2020-07-21 PROCEDURE — 87040 BLOOD CULTURE FOR BACTERIA: CPT | Mod: 59

## 2020-07-21 PROCEDURE — 85025 COMPLETE CBC W/AUTO DIFF WBC: CPT

## 2020-07-21 PROCEDURE — 83735 ASSAY OF MAGNESIUM: CPT

## 2020-07-21 PROCEDURE — 25000003 PHARM REV CODE 250: Performed by: HOSPITALIST

## 2020-07-21 PROCEDURE — 97802 MEDICAL NUTRITION INDIV IN: CPT

## 2020-07-21 PROCEDURE — 99223 1ST HOSP IP/OBS HIGH 75: CPT | Mod: ,,, | Performed by: PSYCHIATRY & NEUROLOGY

## 2020-07-21 PROCEDURE — 99233 PR SUBSEQUENT HOSPITAL CARE,LEVL III: ICD-10-PCS | Mod: ,,, | Performed by: INTERNAL MEDICINE

## 2020-07-21 PROCEDURE — S5010 5% DEXTROSE AND 0.45% SALINE: HCPCS | Performed by: HOSPITALIST

## 2020-07-21 PROCEDURE — 99233 SBSQ HOSP IP/OBS HIGH 50: CPT | Mod: ,,, | Performed by: INTERNAL MEDICINE

## 2020-07-21 PROCEDURE — 99223 PR INITIAL HOSPITAL CARE,LEVL III: ICD-10-PCS | Mod: ,,, | Performed by: PSYCHIATRY & NEUROLOGY

## 2020-07-21 PROCEDURE — 84134 ASSAY OF PREALBUMIN: CPT

## 2020-07-21 PROCEDURE — 36415 COLL VENOUS BLD VENIPUNCTURE: CPT

## 2020-07-21 PROCEDURE — 63600175 PHARM REV CODE 636 W HCPCS: Performed by: INTERNAL MEDICINE

## 2020-07-21 PROCEDURE — 80053 COMPREHEN METABOLIC PANEL: CPT

## 2020-07-21 PROCEDURE — 97165 OT EVAL LOW COMPLEX 30 MIN: CPT

## 2020-07-21 PROCEDURE — 97162 PT EVAL MOD COMPLEX 30 MIN: CPT

## 2020-07-21 PROCEDURE — 99233 PR SUBSEQUENT HOSPITAL CARE,LEVL III: ICD-10-PCS | Mod: ,,, | Performed by: HOSPITALIST

## 2020-07-21 PROCEDURE — 86709 HEPATITIS A IGM ANTIBODY: CPT

## 2020-07-21 PROCEDURE — 25000003 PHARM REV CODE 250: Performed by: INTERNAL MEDICINE

## 2020-07-21 PROCEDURE — 80307 DRUG TEST PRSMV CHEM ANLYZR: CPT

## 2020-07-21 PROCEDURE — 63600175 PHARM REV CODE 636 W HCPCS: Performed by: HOSPITALIST

## 2020-07-21 PROCEDURE — 20600001 HC STEP DOWN PRIVATE ROOM

## 2020-07-21 PROCEDURE — 99233 SBSQ HOSP IP/OBS HIGH 50: CPT | Mod: ,,, | Performed by: HOSPITALIST

## 2020-07-21 PROCEDURE — 87522 HEPATITIS C REVRS TRNSCRPJ: CPT

## 2020-07-21 PROCEDURE — 84100 ASSAY OF PHOSPHORUS: CPT

## 2020-07-21 PROCEDURE — 83605 ASSAY OF LACTIC ACID: CPT

## 2020-07-21 RX ORDER — SODIUM CHLORIDE 9 MG/ML
INJECTION, SOLUTION INTRAVENOUS CONTINUOUS
Status: DISCONTINUED | OUTPATIENT
Start: 2020-07-21 | End: 2020-07-22

## 2020-07-21 RX ORDER — METHADONE HYDROCHLORIDE 5 MG/1
10 TABLET ORAL EVERY 12 HOURS
Status: DISCONTINUED | OUTPATIENT
Start: 2020-07-21 | End: 2020-07-23 | Stop reason: HOSPADM

## 2020-07-21 RX ORDER — LACTULOSE 10 G/15ML
10 SOLUTION ORAL 3 TIMES DAILY
Status: DISCONTINUED | OUTPATIENT
Start: 2020-07-21 | End: 2020-07-23 | Stop reason: HOSPADM

## 2020-07-21 RX ORDER — HEPARIN SODIUM 5000 [USP'U]/ML
5000 INJECTION, SOLUTION INTRAVENOUS; SUBCUTANEOUS EVERY 12 HOURS
Status: DISCONTINUED | OUTPATIENT
Start: 2020-07-21 | End: 2020-07-23 | Stop reason: HOSPADM

## 2020-07-21 RX ORDER — LACTULOSE 10 G/15ML
15 SOLUTION ORAL 3 TIMES DAILY
Status: DISCONTINUED | OUTPATIENT
Start: 2020-07-22 | End: 2020-07-21

## 2020-07-21 RX ORDER — POTASSIUM CHLORIDE 20 MEQ/1
40 TABLET, EXTENDED RELEASE ORAL EVERY 4 HOURS
Status: COMPLETED | OUTPATIENT
Start: 2020-07-21 | End: 2020-07-21

## 2020-07-21 RX ORDER — LORAZEPAM/0.9% SODIUM CHLORIDE 100MG/0.1L
2 PLASTIC BAG, INJECTION (ML) INTRAVENOUS
Status: COMPLETED | OUTPATIENT
Start: 2020-07-21 | End: 2020-07-21

## 2020-07-21 RX ORDER — LACTULOSE 10 G/15ML
30 SOLUTION ORAL 3 TIMES DAILY
Status: DISCONTINUED | OUTPATIENT
Start: 2020-07-22 | End: 2020-07-21

## 2020-07-21 RX ADMIN — PANTOPRAZOLE SODIUM 40 MG: 40 TABLET, DELAYED RELEASE ORAL at 09:07

## 2020-07-21 RX ADMIN — ZINC SULFATE 220 MG (50 MG) CAPSULE 220 MG: CAPSULE at 09:07

## 2020-07-21 RX ADMIN — HEPARIN SODIUM 5000 UNITS: 5000 INJECTION INTRAVENOUS; SUBCUTANEOUS at 10:07

## 2020-07-21 RX ADMIN — MAGNESIUM SULFATE IN WATER 2 G: 40 INJECTION, SOLUTION INTRAVENOUS at 10:07

## 2020-07-21 RX ADMIN — POTASSIUM CHLORIDE: 149 INJECTION, SOLUTION, CONCENTRATE INTRAVENOUS at 02:07

## 2020-07-21 RX ADMIN — HEPARIN SODIUM 5000 UNITS: 5000 INJECTION INTRAVENOUS; SUBCUTANEOUS at 09:07

## 2020-07-21 RX ADMIN — METHADONE HYDROCHLORIDE 10 MG: 5 TABLET ORAL at 10:07

## 2020-07-21 RX ADMIN — POTASSIUM CHLORIDE 40 MEQ: 1500 TABLET, EXTENDED RELEASE ORAL at 10:07

## 2020-07-21 RX ADMIN — METHADONE HYDROCHLORIDE 10 MG: 5 TABLET ORAL at 09:07

## 2020-07-21 RX ADMIN — CEFTRIAXONE 2 G: 2 INJECTION, SOLUTION INTRAVENOUS at 02:07

## 2020-07-21 RX ADMIN — LACTULOSE 10 G: 20 SOLUTION ORAL at 03:07

## 2020-07-21 RX ADMIN — LACTULOSE 10 G: 20 SOLUTION ORAL at 10:07

## 2020-07-21 RX ADMIN — FOLIC ACID 1 MG: 1 TABLET ORAL at 09:07

## 2020-07-21 RX ADMIN — Medication 100 MG: at 09:07

## 2020-07-21 RX ADMIN — SODIUM CHLORIDE: 0.9 INJECTION, SOLUTION INTRAVENOUS at 03:07

## 2020-07-21 RX ADMIN — POTASSIUM CHLORIDE 40 MEQ: 1500 TABLET, EXTENDED RELEASE ORAL at 03:07

## 2020-07-21 RX ADMIN — MAGNESIUM SULFATE IN WATER 2 G: 40 INJECTION, SOLUTION INTRAVENOUS at 12:07

## 2020-07-21 RX ADMIN — RIFAXIMIN 550 MG: 550 TABLET ORAL at 09:07

## 2020-07-21 RX ADMIN — ONDANSETRON 8 MG: 8 TABLET, ORALLY DISINTEGRATING ORAL at 10:07

## 2020-07-21 RX ADMIN — POTASSIUM PHOSPHATE, MONOBASIC AND POTASSIUM PHOSPHATE, DIBASIC 20 MMOL: 224; 236 INJECTION, SOLUTION, CONCENTRATE INTRAVENOUS at 03:07

## 2020-07-21 NOTE — PROGRESS NOTES
[facility]  Nephrology  Progress Note    Patient Name: Nicholas Aiken  MRN: [id number]  Admission Date: 7/20/2020  Hospital Length of Stay: 1 days  Attending Provider: Jesus Thacker MD   Primary Care Physician: Primary Doctor No  Principal Problem:Acute liver failure    Subjective:     HPI: Nicholas Aiken is a 64 year old man with alcoholic and HCV cirrhosis s/p Harvoni, alcohol abuse (last drink per mother 3 weeks ago), gastric varices, history of narcotic abuse on methadone, tobacco use, OA, HTN, anxiety, and hypogonadism who presented as a transfer from the [facility] ED in [place], [place] for diarrhea, worsening hepatic encephalopathy, jaundice, and KELLI. History is obtained mostly from chart review as patient is not a good historian. Patient does not have a history of CKD and Cr baseline is 0.9-1.2. On arrival to [facility] ED, labs were significant for BUN 94, Cr 7.18, bicarb 15, T bili 40, ammonia 212, K 2.2. U/S did not show significant ascites. He was given 2L IVF, lactulose, and rifaximin. Repeat Cr after fluids improved to 5.91. Continued to have multiple BMs and an episode of nonbloody emesis. MELD remained in the 30s and patient was transferred to [facility] for a higher level of care/hepatology. On arrival to [facility] he received another 1L IVF and was started on ceftriaxone for SBP prophylaxis and albumin 25 mg TID. Continued to hold home lisinopril, spironolactone, and propranolol due to KELLI and suspected hypovolemia. Bicarb continued to drop to 11. Nephrology was consulted for KELLI.     Interval History: Patient appears more awake today. Still tremulous trying to eat breakfast. Slightly irritable however answers questions appropriately. Urinating well with 1.8L out yesterday.    Review of patient's allergies indicates:  No Known Allergies  Current Facility-Administered Medications   Medication Frequency    acetaminophen tablet 650 mg Q8H PRN    albuterol-ipratropium 2.5 mg-0.5 mg/3 mL nebulizer solution  3 mL Q6H PRN    dextrose 5 % and 0.45 % NaCl 1,000 mL with potassium chloride 40 mEq, sodium bicarbonate 150 mEq infusion Continuous    dextrose 50% injection 12.5 g PRN    dextrose 50% injection 25 g PRN    folic acid tablet 1 mg Daily    glucagon (human recombinant) injection 1 mg PRN    glucose chewable tablet 16 g PRN    glucose chewable tablet 24 g PRN    heparin (porcine) injection 5,000 Units Q12H    lactulose 20 gram/30 mL solution Soln 45 g TID    LORazepam tablet 1 mg Q4H PRN    magnesium sulfate 2 g/50 ml IVPB Q2H    melatonin tablet 6 mg Nightly PRN    methadone tablet 10 mg Q12H    ondansetron disintegrating tablet 8 mg Q8H PRN    pantoprazole EC tablet 40 mg Daily    potassium chloride SA CR tablet 40 mEq Q4H    potassium phosphate 20 mmol in dextrose 5 % 500 mL infusion Once    prochlorperazine injection Soln 5 mg Q6H PRN    rifAXIMin tablet 550 mg BID    sodium chloride 0.9% flush 10 mL PRN    thiamine tablet 100 mg Daily    zinc sulfate capsule 220 mg Daily       Objective:     Vital Signs (Most Recent):  Temp: 99.1 °F (37.3 °C) (07/21/20 0745)  Pulse: 68 (07/21/20 0745)  Resp: (!) 25 (07/21/20 0745)  BP: (!) 159/91 (07/21/20 0745)  SpO2: 97 % (07/21/20 0745)  O2 Device (Oxygen Therapy): room air (07/21/20 0745) Vital Signs (24h Range):  Temp:  [98 °F (36.7 °C)-99.1 °F (37.3 °C)] 99.1 °F (37.3 °C)  Pulse:  [61-87] 68  Resp:  [18-25] 25  SpO2:  [95 %-98 %] 97 %  BP: (155-166)/(83-91) 159/91     Weight: 94 kg (207 lb 4.8 oz) (07/21/20 0400)  Body mass index is 31.52 kg/m².  Body surface area is 2.12 meters squared.    I/O last 3 completed shifts:  In: 1851.3 [P.O.:360; I.V.:1391.3; IV Piggyback:100]  Out: 1775 [Urine:1700; Stool:75]    Review of Systems   Constitutional: Positive for malaise/fatigue. Negative for chills, fever and weight loss.   HENT: Negative for congestion and sore throat.    Eyes: Negative for blurred vision and double vision.   Respiratory: Negative for cough  and shortness of breath.    Cardiovascular: Negative for chest pain, palpitations and leg swelling.   Gastrointestinal: Positive for diarrhea. Negative for abdominal pain, constipation, nausea and vomiting.   Genitourinary: Negative for dysuria.   Musculoskeletal: Negative for back pain, joint pain and neck pain.   Skin: Negative for rash.   Neurological: Negative for dizziness, loss of consciousness, weakness and headaches.   Psychiatric/Behavioral: Negative for depression. The patient is not nervous/anxious.          Physical Exam  Vitals signs reviewed.   Constitutional:       General: He is not in acute distress.     Appearance: He is well-developed. He is not diaphoretic.   HENT:      Head: Normocephalic and atraumatic.      Mouth/Throat:      Mouth: Mucous membranes are dry.   Eyes:      General: Scleral icterus present.      Conjunctiva/sclera: Conjunctivae normal.   Neck:      Musculoskeletal: Normal range of motion.      Trachea: No tracheal deviation.      Comments: No JVD.  Cardiovascular:      Rate and Rhythm: Normal rate and regular rhythm.      Heart sounds: Normal heart sounds. No murmur.   Pulmonary:      Effort: Pulmonary effort is normal. No respiratory distress.      Breath sounds: Normal breath sounds. No wheezing or rales.   Abdominal:      General: Bowel sounds are normal. There is distension.      Palpations: Abdomen is soft.      Tenderness: There is no abdominal tenderness.   Musculoskeletal:         General: No deformity.      Comments: Trace pretibial pitting edema bilaterally.   Skin:     General: Skin is warm and dry.      Coloration: Skin is jaundiced.      Findings: No erythema or rash.      Comments: Venous stasis dermatitis in bilateral lower extremities.   Neurological:      Mental Status: He is alert.      Cranial Nerves: No cranial nerve deficit.      Sensory: No sensory deficit.      Motor: No abnormal muscle tone.      Comments: A&Ox4. Delayed responses to questions and at times  requires the question to be asked multiple times before a reply is given. Bilateral asterixis is present, however amplitude improved from yesterday.         Significant Labs:  CMP:   Recent Labs   Lab 07/21/20  0645   GLU 89   CALCIUM 9.2   ALBUMIN 2.5*   PROT 6.2   *   K 2.8*   CO2 18*   *   BUN 61*   CREATININE 4.4*   ALKPHOS 146*   ALT 28   AST 68*   BILITOT 31.4*     Recent Labs   Lab 07/20/20  1241   COLORU Chelsy   SPECGRAV 1.010   PHUR 6.0   PROTEINUA Negative   NITRITE Negative   LEUKOCYTESUR Negative     All labs within the past 24 hours have been reviewed.     Significant Imaging:  Labs: Reviewed  US: Reviewed    Assessment/Plan:     KELLI (acute kidney injury)  Metabolic acidosis  Hypokalemia  Hypophosphatemia  Hypomagnesemia  This patient presented with hepatic encephalopathy and 4 days of diarrhea. Also presumably poor oral intake at home considering current debility while inpatient, lives at home alone. UA bland aside from high bilirubin. Has been hemodynamically stable (hypertensive) and renal function improving with IV fluids, making HRS very unlikely. This all points towards a pre-renal etiology. Continued to be acidotic with bicarb 11, pH 7.31. Retroperitoneal U/S WNL. Patient has multiple reasons to have an anion gap (corrected 20.5) metabolic acidosis. Uremia from renal dysfunction and lactic acid accumulation from liver dysfunction can contribute to the gap. Acidosis is further worsened from GI losses. After 1 day of IV bicarb, then D5/1/2NS with KCL 40 mEq and sodium bicarb 150 mEq, patient's acidosis has improved with bicarb 18.    - Stop D5/1/2NS with KCL 40 mEq and sodium bicarb 150 mEq and replace with NS.  - Continue electrolyte replacement with labs q6 until they have improved.  - Strict ins and outs. Agree with placing Gonzalez catheter to more accurately measure ins and outs. Had good UOP with 1.7L on 7/20  - Urine microscopy on 7/21 showed scant hyaline casts but overall bland.  This along with his rapid improvement in renal function with fluids is consistent with a prerenal etiology, not ATN.   - Would recommend decreasing lactulose dose and titrate to 3-4 BMs per day. Patient is having 7-8 BMs per day and this will delay the improvement of his prerenal KELLI, acidosis, and other electrolyte abnormalities.  - The patient does not have any current indications for urgent HD, especially now that acidosis has improved  - Avoid nephrotoxic agents  - Renally dose medications to current CrCl      Thank you for your consult. I will follow-up with patient. Please contact us if you have any additional questions.    Robbi Borges, DO   Internal Medicine, PGY-II  Nephrology  [facility]

## 2020-07-21 NOTE — MEDICAL/APP STUDENT
History & Physical  Nephrology Consult    SUBJECTIVE:   Chief Complaint/Reason for Admission: Acute Liver Failure    History of Present Illness:  Patient is a 64 y.o. male w/ EtOH and Hep C cirrhosis, Alcohol and Narcotic abuse (Last alcoholic drink 3 wks ago), gastric varices, HTN, and anxiety who [place] [facility] [place] from [place] [facility] for diarrhea, hepatic encephalopathy, and jaundice. Pt was consulted to Nephrology for management of KELLI. Pt's mother states that pt was getting weaker and talking slower for the past week. He had multiple episodes of fecal incontinence and urinary incontinence at home before Px to ED. In [place] ED, significant labs were BUN 94, Cr 7.18, T Bili 40, Ammonia 212, HCO3 15, K 2.2. Abd U/S in ED showed no ascitic fluid. Pt was given 2L IVF, 45g Lactulose, and rifaximin.   In the ED, pt had 5 documented stools, 4 urine output, and an episode of nonbloody emesis.   Pt was then transferred to [facility], where he was given 1L IVF, 25g IV Albumin TID,  and started on 1g IV Ceftriaxone for SBP prophylaxis.   MELD Score: 36  Child-Gallego Score: 11  Past Medical History:   Diagnosis Date    Anxiety     Hepatitis C     Hypertension     Testosterone deficiency        Past Surgical History:   Procedure Laterality Date    ear drum replaced      fisure      rgd        Family History   Problem Relation Age of Onset    Arthritis Mother     Diabetes Father     Hypertension Father     Arthritis Father     Hyperlipidemia Father     Stroke Father        Social History     Tobacco Use    Smoking status: Current Every Day Smoker     Types: Cigarettes    Smokeless tobacco: Never Used   Substance Use Topics    Alcohol use: Yes    Drug use: No      Review of patient's allergies indicates:  No Known Allergies    No current facility-administered medications on file prior to encounter.      Current Outpatient Medications on File Prior to Encounter   Medication Sig Dispense Refill    baclofen  (LIORESAL) 20 MG tablet Take 20 mg by mouth 3 (three) times daily. BACLOFEN 20 MG TABS      docusate sodium (COLACE) 100 MG capsule Take 200 mg by mouth as directed.       doxepin (SINEQUAN) 10 MG capsule Take 10 mg by mouth every evening.      fenofibrate 160 MG Tab TAKE 1 TABLET EVERY DAY 90 tablet 1    hydrocodone-acetaminophen 10-325mg (NORCO)  mg Tab HYDROCODONE-ACETAMINOPHEN  MG TABS      lisinopril (PRINIVIL,ZESTRIL) 40 MG tablet TAKE 1 TABLET EVERY DAY 90 tablet 1    melatonin 3 mg Tab Take 3 mg by mouth once daily. [facility] MELATONIN 3 MG TABS      methadone (DOLOPHINE) 10 MG tablet Take 10 mg by mouth 2 (two) times a day. METHADONE HCL 10 MG TABS      niacin (NIASPAN) 750 mg TbSR Take 750 mg by mouth as directed.       pantoprazole (PROTONIX) 40 MG tablet Take 1 tablet (40 mg total) by mouth once daily. 30 tablet 0    potassium 99 mg Tab Take 99 mg by mouth once daily.      propranolol (INDERAL) 20 MG tablet TAKE 1 TABLET EVERY 8 HOURS AS DIRECTED  270 tablet 1    propranolol (INDERAL) 20 MG tablet TAKE 1 TABLET EVERY 8 HOURS AS DIRECTED 270 tablet 0    propranolol (INDERAL) 20 MG tablet TAKE 1 TABLET EVERY 8 HOURS AS DIRECTED 270 tablet 0    spironolactone (ALDACTONE) 50 MG tablet TAKE 1 TABLET EVERY DAY 90 tablet 1    testosterone cypionate (DEPOTESTOTERONE CYPIONATE) 200 mg/mL injection Inject into the muscle every 14 (fourteen) days. TESTOSTERONE CYPIONATE 200 MG/ML SOLN          Review of Systems:  Review of Systems   Constitutional: Positive for chills and malaise/fatigue.   Respiratory: Negative for hemoptysis and shortness of breath.    Cardiovascular: Negative for chest pain and palpitations.   Gastrointestinal: Positive for abdominal pain, diarrhea, nausea and vomiting.   Genitourinary: Positive for frequency.   Skin: Negative for rash.        OBJECTIVE:     Vital Signs (Most Recent)   Temp: 98.1 °F (36.7 °C) (07/20/20 2300)  Pulse: 68 (07/20/20 2300)  Resp: 20 (07/20/20  2300)  BP: (!) 161/87 (07/20/20 2300)  SpO2: 98 % (07/20/20 2300)  Body mass index is 31.71 kg/m².      Physical Exam:  Physical Exam   Constitutional: Vital signs are normal. He appears jaundiced. He appears unhealthy. He has a sickly appearance.   Eyes: Scleral icterus is present.   Cardiovascular: Normal rate, regular rhythm, S1 normal and S2 normal. Exam reveals no friction rub.   Pulmonary/Chest: Effort normal and breath sounds normal. No respiratory distress.   Abdominal: Soft. He exhibits distension. There is hepatomegaly. There is no abdominal tenderness.   No CVA Tenderness or Flank pain   Musculoskeletal:         General: Edema present.      Comments: 1+ edema in BLE   Neurological: He is alert.   Pt Oriented to Person, but not Place or Time.   Bilateral Asterixis present   Skin: Nails show clubbing.       Laboratory:  CBC/Anemia Labs: Coags:    Recent Labs   Lab 07/20/20  0442   WBC 10.76   HGB 12.3*   HCT 36.3*   *   MCV 96   RDW 18.5*    Recent Labs   Lab 07/20/20  0146   INR 1.3*        Chemistries: ABG:   Recent Labs   Lab 07/20/20  0146 07/20/20  0953 07/20/20  1600    142 141   K 2.8*  2.9* 3.0* 3.4*   * 118* 117*   CO2 11* 12* 12*   BUN 77* 72* 69*   CREATININE 5.0* 4.7* 4.6*   CALCIUM 8.4* 8.7 8.8   PROT 6.3  --  6.4   BILITOT 33.8*  --  33.3*   ALKPHOS 181*  --  164*   ALT 34  --  33   AST 91*  --  87*   MG 1.8  --   --    PHOS 2.9 2.8  --     Recent Labs   Lab 07/20/20  1135   PH 7.313*   PCO2 21.6*   PO2 77*   HCO3 10.9*   POCSATURATED 94*   BE -15              ASSESSMENT/PLAN:   Acute Renal Failure: BUN(77) and Cr (5.0) are still elevated. Etiologies are volume depletion, ATN via Bile salt Nephropathy, and hepatorenal Sx. Pt has had multiple episodes of diarrhea over the past week, as well as a few episodes of emesis. Pt Cr improved from 7.1->5.0 after being given Crystalloid IVFs, making volume depletion likely etiology.   - Place Gonzalez Cath for Urinary retention  - Cont  evaluation daily for HD  - Trend BUN/Cr daily  - Avoid Nephrotoxic agents, NSAIDs, and IV Contrast  - Renally Dose Meds  - Q4 Lab checks    Non-Anion Gap Metabolic Acidosis- HCO3 11, pH 7.31, Anion Gap 12. Likely due to losing fluids via emesis and diarrhea.   - IV NaHCO3 150 mEQ Cont.   - Monitor Daily    Hypokalemia- K 2.9. Improved from 2.2 on ED admission  - Replete with Potassium chloride 40 mEQ  - Monitor K due to hypokalemia incidences from NaHCO3 repletion  - Monitor for Arrhythmias  - Q4 Lab checks  Active Hospital Problems    Diagnosis  POA    *Acute liver failure [K72.00]  Yes    Encephalopathy, metabolic [G93.41]  Yes    Narcotic abuse [F11.10]  Yes    KELLI (acute kidney injury) [N17.9]  Yes    Metabolic acidosis [E87.2]  Yes    Hyperammonemia [E72.20]  Yes    Acute renal failure [N17.9]  Yes    Tobacco abuse [Z72.0]  Yes    Dyslipidemia [E78.5]  Yes    Cirrhosis [K74.60]  Yes    Hyperlipidemia [E78.5]  Yes    Carrier of viral hepatitis C [B18.2]  Not Applicable      Resolved Hospital Problems   No resolved problems to display.

## 2020-07-21 NOTE — CONSULTS
"[facility]  Psychiatry  Consult Note    Patient Name: Nicholas Aiken  MRN: [id number]   Code Status: Full Code  Admission Date: 7/20/2020  Hospital Length of Stay: 1 days  Attending Physician: Jesus Thacker MD  Primary Care Provider: Primary Doctor No    Current Legal Status: N/A    Patient information was obtained from patient and ER records.   Inpatient consult to Psychiatry  Consult performed by: Lawrence Amador MD  Consult ordered by: Jesus Thacker MD        Subjective:       HPI:   ADDICTION CONSULT INITIAL EVALUATION      DEPARTMENT:  Psychiatry  SITE: [facility] [address]     DATE OF ADMISSION: 7/20/2020 12:38 AM  LENGTH OF STAY: 1 days     EXAMINING PRACTITIONER: Lawrence Amador     CONSULT REQUESTED BY: Jesus Thacker MD        SUBJECTIVE      CHIEF COMPLAINT  Nicholas Aiken is a 64 y.o. male who is seen today for an initial psychiatric evaluation by the addiction psychiatry consult service.  Nicholas Aiken presents with the chief complaint of: pre-transplant evaluation        HISTORY OF PRESENT ILLNESS     Per Primary MD:  Nicholas Aiken is a/an 64 y.o. male with Hep C, previous alcohol and narcotic abuse, cirrhosis 2/2 EtOH and Hep C s/p Harvoni, gastric varices admitted for hepatic encephalopathy and KELLI in setting of diarrhea. He is orient to self, but otherwise history unreliable from patient.      Per  note, " 64M with cirrhosis 2/2 EtOH and Hep C s/p Harvoni (GI: Dr Hernadez), prior EtOH dependence in remission (last drink 10/2019), last EGD 2016 with gastric varices, OA/chronic pain, h/o narcotic abuse on long term methadone 10 BID (per care Everywhere), tobacco smoking, admit to [facility] yesterday afternoon for decompensated cirrhosis and hepatic encephalopathy, and severe KELLI, in setting of diarrhea.       In ED: AMS/encephalopathy, worsening jaundice, Ammonia 212 (normal 19-60), TB 40, BUN 94/Cr 7.18 in patient with no significant renal Hx, K 2.2, INR " "1.31, platelets 105, WBC 8.6, H/H ok. Abd U/S with doppler, exhibits normal hepatopedal flow and echogenic liver with nodular contour, no ascites, not enough fluid to tap (GI and radiology discussed). CXR wnl. UA wnl except for bili.  Got 2L IVFs bolus.  Started on lactulose TID, multiple BMs, repeated ammonia today and improved at 209.  GI consult added rifaximin today.  Labs unchanged today. K+ up to 2.8, last Cr improved by 2 points to 5.91. Nephrology and GI consulted.  Diarrhea - Stool Cx and C diff sent and pending.   It is uncertain if the KELLI is due to volume depletion due to profuse diarrhea or hepatorenal syndrome - however, he has already improved after crystalloid IVFs alone which may all be explained by volume depletion     Ongoing diarrhea today - 5 documented stools.  Asked about UOP, and he has had 4 urine episodes (not strict Is/Os).  Just had an episode of vomiting (nonbloody)  MELD-Na 36 (using some labs from today and some from yesterday which is not ideal albeit we know MELD is high given TB and Cr)"     Patient had C. Diff checked at OSH, however not performed as formed stool and thus did not meet criteria for testing     Per Addiction Psych MD:  On interview, the patient was confused and had trouble answering questions. He stated that he "felt like he was losing it and kept falling down" which prompted him to come to the hospital. The patient's food arrived and he terminated the interview so he could eat stating he "hadn't been able to eat in many days".     Prior to termination of the interview, he was participating, although he appeared confused- for example he at one point mentioned he was , while later mentioning that he was .  He also perseverated on not having drank for three months, long after the topic had changed.  He appeared frustrated when assessing for patient safety- when asking about SI/HI, the patient repeatedly stated, "I told you don't go there.  Not right " "now."      COLLATERAL  None     SUBSTANCE ABUSE HISTORY  Substance(s) of Choice: Patient refused to answer  Substances Used: Patient refused to answer  History of IVDU?: "A long time ago"  Use of Alcohol: Patient refused to answer  Average Consumption: Patient refused to answer  Last Drink: Patient stated that his last drink was 3 months ago  Use of Medications for Alcohol/Opioid Use Disorder: Patient reports to using methadone for "pain"   History of Complicated Withdrawal: Patient refused to answer  History of Detox: "once, a long time ago"  Rehab History: Patient refused to answer  AA/NA involvement: "went, didn't work"  Tobacco: 1/2 pack a day "all his life"  Spouse/Partner Consumption: Patient states he lives alone- later stated he "lives" in the [facility], raising suspicion for homelessness  Patient Aware of Biomedical Complications: Yes     DSM-5 SUBSTANCE USE DISORDER CRITERIA   Mild (1-3), Moderate (4-5), Severe (?6)  1. Often take in larger amounts or over a longer period of time than was intended.  2. Persistent desire or unsuccessful efforts to cut down or control use.  3. Great deal of time spent in activities necessary to obtain substance, use, or recover from effects.  4. Craving/strong desire for substance or urge to use.  5. Use resulting in failure to fulfill major role obligations at home, work or school.  6. Social, occupational, recreational activities decreased because of use.  7. Continued use despite having persistent or recurrent social or interpersonal problems cause or exaserbated by the substance.  8. Recurrent use in situations in which it is physically hazardous.  9. Use despite physical or psychological problems that are likely to have been caused or exacerbated by the substance.  10. Tolerance, as defined by either of the following.              A. A need for markedly increased amounts of substance to achieve intoxication or desired effect. -OR-               B. A markedly diminished " effect with continued use of the same amount of substance.  11. Withdrawal, as manifested by the following.              A. The characteristic withdrawal syndrome for substance. -AND-              B. Substance is taken to relieve or avoid withdrawal symptoms.     ARE THE CRITERIA MET FOR DSM-5 SUBSTANCE USE DISORDER: Severe        TRANSPLANT EVALUATION  Date first informed of impending organ failure - Unable to assess  When was the subject of transplantation first broached - Unable to assess  Pt made the following lifestyle adjustments and how - Unable to assess  Does the patient accept/understand the connection between substance use and subsequent organ failure - Unable to assess  Date of last use of substances - Unable to assess  Understands need for lifetime medication - Unable to assess  Understands need for lifetime sobriety - Unable to assess  View of AA/NA - Unable to assess  Social support - Unable to assess        Past Psychiatric History:  Previous Medication Trials: Unable to assess  Previous Psychiatric Hospitalizations: Unable to assess  Previous Suicide Attempts: Unable to assess  History of Violence: Unable to assess  Outpatient Psychiatrist: Unable to assess  Outpatient Psychotherapist: Unable to assess     Social History:  Marital Status:   Children: 1   Employment Status/Info: on disability  Education: Unable to assess  Special Ed: Unable to assess  Housing/Lives with: Unable to assess  History of phys/sexual abuse:Unable to assess  Access to gun: Unable to assess  Is the patient aware of the biomedical complications associated with substance abuse and mental illness? yes     Legal History:  Past Charges/Incarcerations:Unable to assess  Pending charges:Unable to assess     Family Psychiatric History:   Unable to assess     Psychosocial Stressors: health.   Functioning Relationships: Unable to assess     Psychosocial Factors:  Unable to assess due to patient encephalopathy and  participation        Psychiatric Review of Systems     FULL ROS unable to assess due to patient participation     MEDICAL ROS     Complete review of systems performed covering Constitutional, Eyes, ENT/Mouth, Cardiovascular, Respiratory, Gastrointestinal, Genitourinary, Musculoskeletal, Skin, Neurologic, Endocrine, Heme/Lymph, and Allergy/Immune.      Complete review of systems was negative with the exception of the following positive symptoms: fatigue, diarrhea     PAST MEDICAL HISTORY        Active Ambulatory Problems     Diagnosis Date Noted    Carotid arterial disease 07/11/2011    Chronic pain associated with significant psychosocial dysfunction 07/11/2011    Dyslipidemia 01/09/2014    Cirrhosis 09/09/2013    Carrier of viral hepatitis C 07/11/2011    Hyperlipidemia 04/22/2013    BP (high blood pressure) 07/11/2011    RICHARDS (dyspnea on exertion) 02/18/2016    Tobacco abuse 02/18/2016    Venous insufficiency 05/19/2016    Macrocytic anemia 05/30/2017           Resolved Ambulatory Problems     Diagnosis Date Noted    No Resolved Ambulatory Problems           Past Medical History:   Diagnosis Date    Anxiety      Hepatitis C      Hypertension      Testosterone deficiency           ALLERGIES  Patient has no known allergies.        MEDICATIONS     Psychotropics:  Ativan 1 mg q4hr PRN     Infusions:   custom IV infusion builder (for pharmacist use only) 75 mL/hr at 07/21/20 0213        Scheduled:   cefTRIAXone  2 g Intravenous Q24H    folic acid  1 mg Oral Daily    lactulose  45 g Oral TID    pantoprazole  40 mg Oral Daily    rifAXIMin  550 mg Oral BID    thiamine  100 mg Oral Daily    zinc sulfate  220 mg Oral Daily        PRN:  acetaminophen, albuterol-ipratropium, dextrose 50%, dextrose 50%, glucagon (human recombinant), glucose, glucose, LORazepam, melatonin, ondansetron, prochlorperazine, sodium chloride 0.9%     Home Medications:          Prior to Admission medications    Medication Sig  Start Date End Date Taking? Authorizing Provider   baclofen (LIORESAL) 20 MG tablet Take 20 mg by mouth 3 (three) times daily. BACLOFEN 20 MG TABS 7/11/11     Historical Provider, MD   docusate sodium (COLACE) 100 MG capsule Take 200 mg by mouth as directed.        Historical Provider, MD   doxepin (SINEQUAN) 10 MG capsule Take 10 mg by mouth every evening.       Historical Provider, MD   fenofibrate 160 MG Tab TAKE 1 TABLET EVERY DAY 7/19/17     Rhonda Frey MD   hydrocodone-acetaminophen 10-325mg (NORCO)  mg Tab HYDROCODONE-ACETAMINOPHEN  MG TABS 4/22/13     Historical Provider, MD   lisinopril (PRINIVIL,ZESTRIL) 40 MG tablet TAKE 1 TABLET EVERY DAY 10/17/17     Rhonda Frey MD   melatonin 3 mg Tab Take 3 mg by mouth once daily. [facility] MELATONIN 3 MG TABS 2/29/12     Historical Provider, MD   methadone (DOLOPHINE) 10 MG tablet Take 10 mg by mouth 2 (two) times a day. METHADONE HCL 10 MG TABS 7/11/11     Historical Provider, MD   niacin (NIASPAN) 750 mg TbSR Take 750 mg by mouth as directed.        Historical Provider, MD   pantoprazole (PROTONIX) 40 MG tablet Take 1 tablet (40 mg total) by mouth once daily. 10/17/16 10/17/17   Rhonda Frey MD   potassium 99 mg Tab Take 99 mg by mouth once daily.       Historical Provider, MD   propranolol (INDERAL) 20 MG tablet TAKE 1 TABLET EVERY 8 HOURS AS DIRECTED  12/5/16     Rhonda Frey MD   propranolol (INDERAL) 20 MG tablet TAKE 1 TABLET EVERY 8 HOURS AS DIRECTED 3/10/17     Rhonda Frey MD   propranolol (INDERAL) 20 MG tablet TAKE 1 TABLET EVERY 8 HOURS AS DIRECTED 7/3/17     Isaura Lutz MD   spironolactone (ALDACTONE) 50 MG tablet TAKE 1 TABLET EVERY DAY 10/4/17     Rhonda Fery MD   testosterone cypionate (DEPOTESTOTERONE CYPIONATE) 200 mg/mL injection Inject into the muscle every 14 (fourteen) days. TESTOSTERONE CYPIONATE 200 MG/ML SOLN 4/22/13     Historical Provider, MD       Hospital Course: As above    Vitals:     07/20/20 2000  "07/20/20 2300 07/21/20 0400 07/21/20 0745   BP: (!) 156/90 (!) 161/87 (!) 155/87 (!) 159/91   BP Location:           Patient Position:           Pulse: 87 68 61 68   Resp:   20 18 (!) 25   Temp: 98.2 °F (36.8 °C) 98.1 °F (36.7 °C) 98 °F (36.7 °C) 99.1 °F (37.3 °C)   TempSrc: Oral Oral Oral Oral   SpO2: 98% 98% 95% 97%   Weight:     94 kg (207 lb 4.8 oz)     Height:                     CONSTITUTIONAL  General Appearance and Manner: Unshaven, dressed in casual clothing, poor grooming     MUSCULOSKELETAL  Abnormal Involuntary Movements: Tremors  Gait and Station: Not assessed     PSYCHIATRIC   Orientation: Oriented to person and place  Speech: Not spontaneous, hushed volume, normal tone  Language: English  Mood: Neutral  Affect: Constricted  Thought Process: Somewhat disorganized  Associations: Logical  Thought Content: No SI/HI voiced, did not voice any delusional content  Memory: Unable to assess  Attention and Concentration: Unable to assess  Fund of Knowledge: Unable to assess  Insight: Questionable  Judgment: Questionable      Assessment/Plan:     Alcohol use disorder, severe, in controlled environment  ASSESSMENT:      DIAGNOSES & PROBLEMS     etOH use disorder, severe  History of methadone use  EMR documentation of "Narcotic Abuse"     STRENGTHS AND LIABILITIES   Strength: Patient is motivated for change., Strength: Patient has reasonable judgment., Liability: Patient is defensive., Liability: Patient has poor health.        MOTIVATION TO PURSUE RECOVERY: unable to assess     ACCEPTANCE OF ADDICTION: fair     ABILITY TO ADHERE TO TREATMENT PLAN: questionable        PLAN:         TRANSPLANT SUITABILITY  From a psychiatric perspective, this patient presents as a high risk for transplant, especially given his last etOH use being at some point in the past 90 days.  The patient is encephalopathic at the moment and is not willing to discuss treatment options at this time.  This may be due to encephalopathy, but the " appropriate environment to address his etOH use would be rehab vs. IOP and 12 step meetings.   aware confirms Methadone 10 mg dose.          MANAGEMENT PLAN, TREATMENT GOALS, THERAPEUTIC TECHNIQUES/APPROACHES & CLINICAL REASONING     --Methadone management per primary team- patient states that he receives this from pain clinic  --Patient should pursue IOP vs Rehab to address etOH use, can bolster efforts with 12-step meetings  --Minimal concerns for withdrawal  --Patient would not discuss further psychiatric history when being evaluated        --Patient counseled on abstinence from alcohol and substances of abuse (illicit and prescription).  --Additional workup planned to address substance use disorder, in order to guide and refine ongoing management options, includes serial alcohol and drug laboratory testing (e.g. PETH, urine toxicology).  --Relapse prevention and motivational interviewing provided.  --Education provided on 12 step recovery programs.        PRESCRIPTION DRUG MANAGEMENT  - The risks and benefits of medication were discussed with this patient.  - Possible expectable adverse effects of any current or proposed individual psychotropic agents were discussed with this patient.  - Counseling was provided on the importance of full compliance with medication regimens.        In cases of emergency, daily coverage provided by Acute/ER Psych MD, NP, or SW, with contact numbers located in [facility] On Call Schedule     Case discussed with staff addiction psychiatrist: MD Lawrence PIERRE MD  [facility], PGY-2     Labs/Imaging/Studies:   Recent Results[]Expand by Default         Recent Results (from the past 24 hour(s))   Renal function panel     Collection Time: 07/20/20  9:53 AM   Result Value Ref Range     Glucose 70 70 - 110 mg/dL     Sodium 142 136 - 145 mmol/L     Potassium 3.0 (L) 3.5 - 5.1 mmol/L     Chloride 118 (H) 95 - 110 mmol/L     CO2 12 (L) 23  - 29 mmol/L     BUN, Bld 72 (H) 8 - 23 mg/dL     Calcium 8.7 8.7 - 10.5 mg/dL     Creatinine 4.7 (H) 0.5 - 1.4 mg/dL     Albumin 1.8 (L) 3.5 - 5.2 g/dL     Phosphorus 2.8 2.7 - 4.5 mg/dL     eGFR if  14.2 (A) >60 mL/min/1.73 m^2     eGFR if non  12.3 (A) >60 mL/min/1.73 m^2     Anion Gap 12 8 - 16 mmol/L   ISTAT PROCEDURE     Collection Time: 07/20/20 11:35 AM   Result Value Ref Range     POC PH 7.313 (L) 7.35 - 7.45     POC PCO2 21.6 (L) 35 - 45 mmHg     POC PO2 77 (HH) 40 - 60 mmHg     POC HCO3 10.9 (L) 24 - 28 mmol/L     POC BE -15 -2 to 2 mmol/L     POC SATURATED O2 94 (L) 95 - 100 %     POC TCO2 12 (L) 24 - 29 mmol/L     Sample VENOUS       Site Other       Allens Test N/A       DelSys Room Air       Mode SPONT       FiO2 21     Sodium, urine, random     Collection Time: 07/20/20 12:40 PM   Result Value Ref Range     Sodium Urine Random 79 20 - 250 mmol/L   Creatinine, urine, random     Collection Time: 07/20/20 12:40 PM   Result Value Ref Range     Creatinine, Random Ur 66.0 23.0 - 375.0 mg/dL   Protein / creatinine ratio, urine     Collection Time: 07/20/20 12:40 PM   Result Value Ref Range     Protein, Urine Random 13 0 - 15 mg/dL     Creatinine, Random Ur 66.0 23.0 - 375.0 mg/dL     Prot/Creat Ratio, Ur 0.20 0.00 - 0.20   Urea nitrogen, urine     Collection Time: 07/20/20 12:40 PM   Result Value Ref Range     Urine Urea Nitrogen, Random 396 140 - 1050 mg/dL   Urinalysis, Reflex to Urine Culture Urine, Clean Catch     Collection Time: 07/20/20 12:41 PM     Specimen: Urine   Result Value Ref Range     Specimen UA Urine, Catheterized       Color, UA Chelsy Yellow, Straw, Chelsy     Appearance, UA Clear Clear     pH, UA 6.0 5.0 - 8.0     Specific Gravity, UA 1.010 1.005 - 1.030     Protein, UA Negative Negative     Glucose, UA Negative Negative     Ketones, UA Negative Negative     Bilirubin (UA) 2+ (A) Negative     Occult Blood UA Negative Negative     Nitrite, UA Negative  Negative     Leukocytes, UA Negative Negative   Mcfarlane's Stain, Urine Random     Collection Time: 07/20/20 12:41 PM   Result Value Ref Range     Mcfarlane's Stain, Ur No eosinophils seen No eosinophils seen   Comprehensive metabolic panel     Collection Time: 07/20/20  4:00 PM   Result Value Ref Range     Sodium 141 136 - 145 mmol/L     Potassium 3.4 (L) 3.5 - 5.1 mmol/L     Chloride 117 (H) 95 - 110 mmol/L     CO2 12 (L) 23 - 29 mmol/L     Glucose 82 70 - 110 mg/dL     BUN, Bld 69 (H) 8 - 23 mg/dL     Creatinine 4.6 (H) 0.5 - 1.4 mg/dL     Calcium 8.8 8.7 - 10.5 mg/dL     Total Protein 6.4 6.0 - 8.4 g/dL     Albumin 2.4 (L) 3.5 - 5.2 g/dL     Total Bilirubin 33.3 (H) 0.1 - 1.0 mg/dL     Alkaline Phosphatase 164 (H) 55 - 135 U/L     AST 87 (H) 10 - 40 U/L     ALT 33 10 - 44 U/L     Anion Gap 12 8 - 16 mmol/L     eGFR if  14.6 (A) >60 mL/min/1.73 m^2     eGFR if non African American 12.6 (A) >60 mL/min/1.73 m^2   Magnesium     Collection Time: 07/21/20  6:45 AM   Result Value Ref Range     Magnesium 1.5 (L) 1.6 - 2.6 mg/dL   Phosphorus     Collection Time: 07/21/20  6:45 AM   Result Value Ref Range     Phosphorus 1.6 (L) 2.7 - 4.5 mg/dL   CBC with Automated Differential     Collection Time: 07/21/20  6:45 AM   Result Value Ref Range     WBC 10.29 3.90 - 12.70 K/uL     RBC 3.37 (L) 4.60 - 6.20 M/uL     Hemoglobin 11.0 (L) 14.0 - 18.0 g/dL     Hematocrit 32.7 (L) 40.0 - 54.0 %     Mean Corpuscular Volume 97 82 - 98 fL     Mean Corpuscular Hemoglobin 32.6 (H) 27.0 - 31.0 pg     Mean Corpuscular Hemoglobin Conc 33.6 32.0 - 36.0 g/dL     RDW 18.6 (H) 11.5 - 14.5 %     Platelets 100 (L) 150 - 350 K/uL     MPV SEE COMMENT 9.2 - 12.9 fL     Immature Granulocytes 3.1 (H) 0.0 - 0.5 %     Gran # (ANC) 8.4 (H) 1.8 - 7.7 K/uL     Immature Grans (Abs) 0.32 (H) 0.00 - 0.04 K/uL     Lymph # 0.7 (L) 1.0 - 4.8 K/uL     Mono # 0.7 0.3 - 1.0 K/uL     Eos # 0.1 0.0 - 0.5 K/uL     Baso # 0.09 0.00 - 0.20 K/uL     nRBC 1 (A) 0  /100 WBC     Gran% 81.7 (H) 38.0 - 73.0 %     Lymph% 6.9 (L) 18.0 - 48.0 %     Mono% 6.7 4.0 - 15.0 %     Eosinophil% 0.7 0.0 - 8.0 %     Basophil% 0.9 0.0 - 1.9 %     Differential Method Automated     Comprehensive Metabolic Panel (CMP)     Collection Time: 07/21/20  6:45 AM   Result Value Ref Range     Sodium 147 (H) 136 - 145 mmol/L     Potassium 2.8 (L) 3.5 - 5.1 mmol/L     Chloride 117 (H) 95 - 110 mmol/L     CO2 18 (L) 23 - 29 mmol/L     Glucose 89 70 - 110 mg/dL     BUN, Bld 61 (H) 8 - 23 mg/dL     Creatinine 4.4 (H) 0.5 - 1.4 mg/dL     Calcium 9.2 8.7 - 10.5 mg/dL     Total Protein 6.2 6.0 - 8.4 g/dL     Albumin 2.5 (L) 3.5 - 5.2 g/dL     Total Bilirubin 31.4 (H) 0.1 - 1.0 mg/dL     Alkaline Phosphatase 146 (H) 55 - 135 U/L     AST 68 (H) 10 - 40 U/L     ALT 28 10 - 44 U/L     Anion Gap 12 8 - 16 mmol/L     eGFR if African American 15.4 (A) >60 mL/min/1.73 m^2     eGFR if non  13.3 (A) >60 mL/min/1.73 m^2   PT/INR     Collection Time: 07/21/20  6:45 AM   Result Value Ref Range     Prothrombin Time 13.8 (H) 9.0 - 12.5 sec     INR 1.3 (H) 0.8 - 1.2   Lactic acid, plasma     Collection Time: 07/21/20  6:45 AM   Result Value Ref Range     Lactate (Lactic Acid) 0.8 0.5 - 2.2 mmol/L   Prealbumin     Collection Time: 07/21/20  6:45 AM   Result Value Ref Range     Prealbumin 8 (L) 20 - 43 mg/dL

## 2020-07-21 NOTE — PLAN OF CARE
Problem: Malnutrition  Goal: Improved Nutritional Intake  Outcome: Ongoing, Progressing   Recommendations     1. Continue low sodium diet with Novasource ONS.  Goals: 1. Pt's intake meals >75% by RD follow up.  Nutrition Goal Status: new  Communication of RD Recs: (POC)

## 2020-07-21 NOTE — PROGRESS NOTES
Entered room to hang second dose of mag IVPB.  Pt noted to be soiled.  Upon turning to change noted that rectal tube that was placed less than 30 min ago out while still inflated.

## 2020-07-21 NOTE — PT/OT/SLP EVAL
"Physical Therapy Evaluation    Patient Name:  Nicholas Aiken   MRN:  [id number]    Recommendations:     Discharge Recommendations:  Recommendations pending further evaluation.  Discharge Equipment Recommendations: other (see comments)(TBD based on further assessment)   Barriers to discharge: unknown living situation    Assessment:     Nicholas Aiken is a 64 y.o. male admitted with a medical diagnosis of Acute liver failure.  He presents with the following impairments/functional limitations:  weakness, impaired endurance, impaired functional mobilty, impaired cognition, impaired self care skills, decreased safety awareness, decreased lower extremity function. A limited evaluation was performed this visit secondary to patient confusion/cognitive status and constant bowel movements. Pt was able to roll in bed and move LEs against gravity. Discharge recommendations pending further evaluation.    Rehab Prognosis: Good; patient would benefit from acute skilled PT services to address these deficits and reach maximum level of function.    Recent Surgery: * No surgery found *      Plan:     During this hospitalization, patient to be seen 4 x/week to address the identified rehab impairments via gait training, therapeutic activities, therapeutic exercises, neuromuscular re-education and progress toward the following goals:    · Plan of Care Expires:  08/20/20    Subjective     Chief Complaint: Frequent bowel movements, pt reports that he cant control his bowels   Patient/Family Comments/goals: "I cant stop going to the bathroom, I can't stop it"   Pain/Comfort:   · Pain Rating 1: 0/10    Patients cultural, spiritual, Pentecostal conflicts given the current situation: no    Living Environment:  Pt is a poor historian but he states that he lives in a 2 story log cabin with many steps to enter in [place], [place]. He reports that he lives alone but that his 87 year old mother prepares his meals and provides assistance when he needs " "it. He reports that he needed assist for ADLs prior to this admission.  He reports that he does not have any assistive devices, but there is a cane in the room. He states that the cane is his moms but that she could not be here today. It is unclear as to what DME patient owns, but possibly a shower chair, RW, and cane. He states that he will have help from his mother upon discharge.     Objective:     Communicated with RN prior to session.  Patient found supine with issa catheter, peripheral IV  upon PT entry to room.    General Precautions: Standard, fall   Orthopedic Precautions:N/A   Braces: N/A     Exams:  · Cognitive Exam:  Patient is oriented to Person, Place, Time and Situation. Pt able to answer orientation questions correctly but in conversation pt is confused and unable to provide a consistent history.   · Sensation:    · -       Intact  · RLE ROM: WFL  · RLE Strength: Not formally assessed, pt able to move LEs against gravity in supine  · LLE ROM: WFL  · LLE Strength: Not formally assessed, pt able to move LEs against gravity in supine    Functional Mobility:  · Bed Mobility:     · Rolling Left:  minimum assistance  · Rolling Right: minimum assistance    Therapeutic Activities and Exercises:   Pt demonstrated the ability to roll in bed with Martir to clean patient after a bowel movement. After cleaning, PT and OT attempting to prepare patient for supine > sit transfer, pt states "im going again, im going again". Pt with constant and repeated bowel movements. Pt was cleaned and aby pads replaced, pt left in supine to finish using the bathroom and RN was notified of patient status. PT to return tomorrow to further assess patient mobility.     AM-PAC 6 CLICK MOBILITY  Total Score:12     Patient left supine with all lines intact and call button in reach.    GOALS:   Multidisciplinary Problems     Physical Therapy Goals        Problem: Physical Therapy Goal    Goal Priority Disciplines Outcome Goal Variances " Interventions   Physical Therapy Goal     PT, PT/OT Ongoing, Progressing     Description: Goals to be met by: 2020     Patient will increase functional independence with mobility by performin. Supine to sit with MInimal Assistance  2. Sit to supine with MInimal Assistance  3. Rolling to Left and Right with Modified Violet.                     History:     Past Medical History:   Diagnosis Date    Anxiety     Hepatitis C     Hypertension     Testosterone deficiency        Past Surgical History:   Procedure Laterality Date    ear drum replaced      fisure      rgd         Time Tracking:     PT Received On: 20  PT Start Time: 1129     PT Stop Time: 1149  PT Total Time (min): 20 min     Billable Minutes: Evaluation 15      LAKESHA ROSAS PT  2020

## 2020-07-21 NOTE — ASSESSMENT & PLAN NOTE
Metabolic acidosis  Hypokalemia  Hypophosphatemia  Hypernatremia  This patient presented with hepatic encephalopathy and 4 days of diarrhea. Also presumably poor oral intake at home considering current debility while inpatient, lives at home alone. UA bland aside from high bilirubin. Has been hemodynamically stable (hypertensive) and renal function improving with IV fluids, making HRS very unlikely. This all points towards a pre-renal etiology. Continued to be acidotic with bicarb 11, pH 7.31. Retroperitoneal U/S WNL. Patient has multiple reasons to have an anion gap (corrected 20.5) metabolic acidosis. Uremia from renal dysfunction and lactic acid accumulation from liver dysfunction can contribute to the gap. Acidosis is further worsened from GI losses. After 1 day of IV bicarb, then D5/1/2NS with KCL 40 mEq and sodium bicarb 150 mEq, patient's acidosis has improved with bicarb 18.    - On 7/21 we stopped D5/1/2NS with KCL 40 mEq and sodium bicarb 150 mEq and replaced with NS due to new hypernatremia with Na 147, however the next day sodium was unchanged. Free water deficit is 2.4L ans still on fluid restriction of 1.2L per day. We would recommend liberalizing the fluid restriction. If you are stopping the NS, would recommend encouraging aggressive PO hydration or re-starting the NS until the sodium normalizes.  - Strict ins and outs and daily weights  - Urine microscopy on 7/21 showed scant hyaline casts but overall bland. This along with his rapid improvement in renal function with fluids is consistent with a prerenal etiology, not ATN.   - Would recommend decreasing lactulose dose and titrate to 3-4 BMs per day. Patient is having 7-8 BMs per day and this will delay the improvement of his prerenal KELLI, acidosis, and other electrolyte abnormalities.  - The patient does not have any current indications for urgent HD, especially now that acidosis has improved  - Avoid nephrotoxic agents  - Renally dose medications  to current CrCl

## 2020-07-21 NOTE — SUBJECTIVE & OBJECTIVE
Vitals:     07/20/20 2000 07/20/20 2300 07/21/20 0400 07/21/20 0745   BP: (!) 156/90 (!) 161/87 (!) 155/87 (!) 159/91   BP Location:           Patient Position:           Pulse: 87 68 61 68   Resp:   20 18 (!) 25   Temp: 98.2 °F (36.8 °C) 98.1 °F (36.7 °C) 98 °F (36.7 °C) 99.1 °F (37.3 °C)   TempSrc: Oral Oral Oral Oral   SpO2: 98% 98% 95% 97%   Weight:     94 kg (207 lb 4.8 oz)     Height:                     CONSTITUTIONAL  General Appearance and Manner: Unshaven, dressed in casual clothing, poor grooming     MUSCULOSKELETAL  Abnormal Involuntary Movements: Tremors  Gait and Station: Not assessed     PSYCHIATRIC   Orientation: Oriented to person and place  Speech: Not spontaneous, hushed volume, normal tone  Language: English  Mood: Neutral  Affect: Constricted  Thought Process: Somewhat disorganized  Associations: Logical  Thought Content: No SI/HI voiced, did not voice any delusional content  Memory: Unable to assess  Attention and Concentration: Unable to assess  Fund of Knowledge: Unable to assess  Insight: Questionable  Judgment: Questionable

## 2020-07-21 NOTE — CONSULTS
"  [facility]  Adult Nutrition  Consult Note    SUMMARY     Recommendations    1. Continue low sodium diet with Novasource ONS.  Goals: 1. Pt's intake meals >75% by RD follow up.  Nutrition Goal Status: new  Communication of RD Recs: (POC)    Reason for Assessment    Reason For Assessment: consult  Diagnosis: liver disease(hepatic encephalopathy, KELLI)  Relevant Medical History: HCV, ETOH cirrhosis 2/2 ETOH abuse, PSA, gastric varices  Interdisciplinary Rounds: did not attend  General Information Comments: Pt is poor historian. Noted with stage 1 PI to right buttocks. Very little wt history in chart; pt reports he is usually 220 lbs--indicates 13 lb (6%) wt loss over last few weeks. Per pt, he was not eating anything, only drinking Sprite & alcohol. Breakfast tray picked at, only a few bites taken. Pt with diarrhea since admission.Very jaundiced. Completed NFPE today 7/21: pt has mild to moderate muscle wasting and fat depletion and meets criteria for chronic moderate malnutrition.  Nutrition Discharge Planning: Discharge on low sodium, higher protein diet.    Nutrition Risk Screen    Nutrition Risk Screen: no indicators present    Nutrition/Diet History    Factors Affecting Nutritional Intake: excessive alcohol intake    Anthropometrics    Temp: 99.1 °F (37.3 °C)  Height Method: Stated  Height: 5' 8" (172.7 cm)  Height (inches): 68 in  Weight Method: Bed Scale  Weight: 94 kg (207 lb 4.8 oz)  Weight (lb): 207.3 lb  Ideal Body Weight (IBW), Male: 154 lb  % Ideal Body Weight, Male (lb): 135.43 %  BMI (Calculated): 31.5       Lab/Procedures/Meds    Pertinent Labs Reviewed: reviewed  Pertinent Labs Comments: K 3.4, Cl 117, BUN 69, Cre 4.6, eGFR 12.6, Alk phos 164, Alob 2.4, T. Bili 33.3  Pertinent Medications Reviewed: reviewed  Pertinent Medications Comments: albumin human, folic acid, thiamine, lactulose, pantoprazole, zinc sulfate, IVF    Estimated/Assessed Needs    Weight Used For Calorie " Calculations: 94 kg (207 lb 3.7 oz)  Energy Calorie Requirements (kcal): 1880 kcal  Energy Need Method: Kershaw-St Jeor(PAL 1.10)  Protein Requirements: 104-113g  Weight Used For Protein Calculations: 94 kg (207 lb 3.7 oz)        RDA Method (mL): 1880         Nutrition Prescription Ordered    Current Diet Order: low sodium, 1200ml fluid restriction  Oral Nutrition Supplement: Novasource ONS TID    Evaluation of Received Nutrient/Fluid Intake    Comments: LBM 7/21  % Intake of Estimated Energy Needs: 0 - 25 %  % Meal Intake: 0 - 25 %    Nutrition Risk    Level of Risk/Frequency of Follow-up: low     Assessment and Plan    Nutrition Problem:  Moderate Protein-Calorie Malnutrition  Malnutrition in the context of Chronic Illness    Related to (etiology):  Alcoholism, liver failure    Signs and Symptoms (as evidenced by):  Energy Intake: <50% of estimated energy requirement for 1+ months  Body Fat Depletion: moderate depletion of orbitals, triceps   Muscle Mass Depletion: mild to moderate depletion of temples, clavicles, shoulders, hands, LEs  Weight Loss: predicted 6% x several weeks   Fluid accumulation: moderate ascites, mild edema to arms    Interventions(treatment strategy):  Collaboration of care with providers  Commercial beverage: Novasource ONS TID    Nutrition Diagnosis Status:  New      Monitor and Evaluation    Food and Nutrient Intake: energy intake, food and beverage intake  Food and Nutrient Adminstration: diet order  Anthropometric Measurements: weight, weight change, body mass index  Biochemical Data, Medical Tests and Procedures: electrolyte and renal panel, gastrointestinal profile, glucose/endocrine profile, inflammatory profile, lipid profile  Nutrition-Focused Physical Findings: overall appearance, extremities, muscles and bones, head and eyes, skin     Malnutrition Assessment             Weight Loss (Malnutrition): (predicted 6% x 2 weeks)  Energy Intake (Malnutrition): less than or equal to 50%  for greater than or equal to 1 month   Orbital Region (Subcutaneous Fat Loss): moderate depletion  Upper Arm Region (Subcutaneous Fat Loss): moderate depletion  Thoracic and Lumbar Region: (moderate ascites)   [place]ai Region (Muscle Loss): moderate depletion  Clavicle Bone Region (Muscle Loss): mild depletion  Clavicle and Acromion Bone Region (Muscle Loss): mild depletion  Scapular Bone Region (Muscle Loss): mild depletion  Dorsal Hand (Muscle Loss): moderate depletion  Patellar Region (Muscle Loss): mild depletion  Anterior Thigh Region (Muscle Loss): mild depletion  Posterior Calf Region (Muscle Loss): mild depletion                 Nutrition Follow-Up    RD Follow-up?: Yes

## 2020-07-21 NOTE — PLAN OF CARE
Problem: Physical Therapy Goal  Goal: Physical Therapy Goal  Description: Goals to be met by: 2020     Patient will increase functional independence with mobility by performin. Supine to sit with MInimal Assistance  2. Sit to supine with MInimal Assistance  3. Rolling to Left and Right with Modified [place].    Outcome: Ongoing, Progressing     Limited evaluation performed this visit due to patient cognitive status and repeated bowel movements. PT to further assess and further address POC next visit.     Ana Ji, PT, DPT  2020

## 2020-07-21 NOTE — NURSING
flexiseal came out with blub inflated. Did not re-insert another just yet. Padded pt well and will assess frequently. Large amount loose watery stool.

## 2020-07-21 NOTE — PLAN OF CARE
SW completed discharge planning assessment via medical record. Upon first visit to bedside, SW was interrupted by nursing team coming to clean patient. Upon second visit to bedside, patient was being seen by another member of the treatment team. Upon third visit to bedside, patient was confused and unable to answer assessment questions. SW attempted to reach patient's mother via cell phone; however, person answering the phone informed SW that patient's mother was not available.     If patient does not have transportation available at discharge, SW will help schedule this.     Functional status was independent prior and patient utilized shower chair and straight cane.     Patient lives alone.     PCP: Primary Doctor No    Pharmacy:   [facility] - [place], [place] - [address]  [address] [address]  Phone: [phone] Fax: [phone]    [facility] [place], [place] - [address]  [address] [address]  Phone: [phone] Fax: [phone]    Payor: Payor: [employer] MEDICARE / Plan: [employer] MEDICARE HMO / Product Type: Capitation /        07/21/20 1611   Discharge Assessment   Assessment Type Discharge Planning Assessment   Confirmed/corrected address and phone number on facesheet? Yes   Assessment information obtained from? Patient   Prior to hospitilization cognitive status: Alert/Oriented   Prior to hospitalization functional status: Needs Assistance   Current cognitive status:   (Not oriented)   Current Functional Status: Partially Dependent   Facility Arrived From: [facility] With alone   Able to Return to Prior Arrangements other (see comments)  (TBD)   Is patient able to care for self after discharge? Unable to determine at this time (comments)   Who are your caregiver(s) and their phone number(s)? Mariluz Aiken (mother) [phone]   Patient's perception of discharge disposition home or selfcare   Readmission Within the Last 30  Days no previous admission in last 30 days   Patient currently being followed by outpatient case management? No   Patient currently receives any other outside agency services? No   Equipment Currently Used at Home shower chair;cane, straight   Do you have any problems affording any of your prescribed medications? No   Does the patient have transportation home? Yes   Transportation Anticipated family or friend will provide   Dialysis Name and Scheduled days n/a   Does the patient receive services at the [facility]mad[facility] Clinic? No   Discharge Plan A Home Health   Discharge Plan B Rehab   DME Needed Upon Discharge  other (see comments)  (TBD)   Patient/Family in Agreement with Plan unable to assess     Chantelle Rae LMSW   - Case Management

## 2020-07-21 NOTE — SUBJECTIVE & OBJECTIVE
Interval History: Patient appears more awake today. Still tremulous trying to eat breakfast. Slightly irritable however answers questions appropriately. Urinating well with 1.8L out yesterday.    Review of patient's allergies indicates:  No Known Allergies  Current Facility-Administered Medications   Medication Frequency    acetaminophen tablet 650 mg Q8H PRN    albuterol-ipratropium 2.5 mg-0.5 mg/3 mL nebulizer solution 3 mL Q6H PRN    dextrose 5 % and 0.45 % NaCl 1,000 mL with potassium chloride 40 mEq, sodium bicarbonate 150 mEq infusion Continuous    dextrose 50% injection 12.5 g PRN    dextrose 50% injection 25 g PRN    folic acid tablet 1 mg Daily    glucagon (human recombinant) injection 1 mg PRN    glucose chewable tablet 16 g PRN    glucose chewable tablet 24 g PRN    heparin (porcine) injection 5,000 Units Q12H    lactulose 20 gram/30 mL solution Soln 45 g TID    LORazepam tablet 1 mg Q4H PRN    magnesium sulfate 2 g/50 ml IVPB Q2H    melatonin tablet 6 mg Nightly PRN    methadone tablet 10 mg Q12H    ondansetron disintegrating tablet 8 mg Q8H PRN    pantoprazole EC tablet 40 mg Daily    potassium chloride SA CR tablet 40 mEq Q4H    potassium phosphate 20 mmol in dextrose 5 % 500 mL infusion Once    prochlorperazine injection Soln 5 mg Q6H PRN    rifAXIMin tablet 550 mg BID    sodium chloride 0.9% flush 10 mL PRN    thiamine tablet 100 mg Daily    zinc sulfate capsule 220 mg Daily       Objective:     Vital Signs (Most Recent):  Temp: 99.1 °F (37.3 °C) (07/21/20 0745)  Pulse: 68 (07/21/20 0745)  Resp: (!) 25 (07/21/20 0745)  BP: (!) 159/91 (07/21/20 0745)  SpO2: 97 % (07/21/20 0745)  O2 Device (Oxygen Therapy): room air (07/21/20 0745) Vital Signs (24h Range):  Temp:  [98 °F (36.7 °C)-99.1 °F (37.3 °C)] 99.1 °F (37.3 °C)  Pulse:  [61-87] 68  Resp:  [18-25] 25  SpO2:  [95 %-98 %] 97 %  BP: (155-166)/(83-91) 159/91     Weight: 94 kg (207 lb 4.8 oz) (07/21/20 0400)  Body mass index is  31.52 kg/m².  Body surface area is 2.12 meters squared.    I/O last 3 completed shifts:  In: 1851.3 [P.O.:360; I.V.:1391.3; IV Piggyback:100]  Out: 1775 [Urine:1700; Stool:75]    Review of Systems   Constitutional: Positive for malaise/fatigue. Negative for chills, fever and weight loss.   HENT: Negative for congestion and sore throat.    Eyes: Negative for blurred vision and double vision.   Respiratory: Negative for cough and shortness of breath.    Cardiovascular: Negative for chest pain, palpitations and leg swelling.   Gastrointestinal: Positive for diarrhea. Negative for abdominal pain, constipation, nausea and vomiting.   Genitourinary: Negative for dysuria.   Musculoskeletal: Negative for back pain, joint pain and neck pain.   Skin: Negative for rash.   Neurological: Negative for dizziness, loss of consciousness, weakness and headaches.   Psychiatric/Behavioral: Negative for depression. The patient is not nervous/anxious.          Physical Exam  Vitals signs reviewed.   Constitutional:       General: He is not in acute distress.     Appearance: He is well-developed. He is not diaphoretic.   HENT:      Head: Normocephalic and atraumatic.      Mouth/Throat:      Mouth: Mucous membranes are dry.   Eyes:      General: Scleral icterus present.      Conjunctiva/sclera: Conjunctivae normal.   Neck:      Musculoskeletal: Normal range of motion.      Trachea: No tracheal deviation.      Comments: No JVD.  Cardiovascular:      Rate and Rhythm: Normal rate and regular rhythm.      Heart sounds: Normal heart sounds. No murmur.   Pulmonary:      Effort: Pulmonary effort is normal. No respiratory distress.      Breath sounds: Normal breath sounds. No wheezing or rales.   Abdominal:      General: Bowel sounds are normal. There is distension.      Palpations: Abdomen is soft.      Tenderness: There is no abdominal tenderness.   Musculoskeletal:         General: No deformity.      Comments: Trace pretibial pitting edema  bilaterally.   Skin:     General: Skin is warm and dry.      Coloration: Skin is jaundiced.      Findings: No erythema or rash.      Comments: Venous stasis dermatitis in bilateral lower extremities.   Neurological:      Mental Status: He is alert.      Cranial Nerves: No cranial nerve deficit.      Sensory: No sensory deficit.      Motor: No abnormal muscle tone.      Comments: A&Ox4. Delayed responses to questions and at times requires the question to be asked multiple times before a reply is given. Bilateral asterixis is present, however amplitude improved from yesterday.         Significant Labs:  CMP:   Recent Labs   Lab 07/21/20  0645   GLU 89   CALCIUM 9.2   ALBUMIN 2.5*   PROT 6.2   *   K 2.8*   CO2 18*   *   BUN 61*   CREATININE 4.4*   ALKPHOS 146*   ALT 28   AST 68*   BILITOT 31.4*     Recent Labs   Lab 07/20/20  1241   COLORU Chelsy   SPECGRAV 1.010   PHUR 6.0   PROTEINUA Negative   NITRITE Negative   LEUKOCYTESUR Negative     All labs within the past 24 hours have been reviewed.     Significant Imaging:  Labs: Reviewed  US: Reviewed

## 2020-07-21 NOTE — HPI
"ADDICTION CONSULT INITIAL EVALUATION      DEPARTMENT:  Psychiatry  SITE: [facility], [address]     DATE OF ADMISSION: 7/20/2020 12:38 AM  LENGTH OF STAY: 1 days     EXAMINING PRACTITIONER: Lawrence Amador     CONSULT REQUESTED BY: Jesus Thacker MD        SUBJECTIVE      CHIEF COMPLAINT  Nicholas Aiken is a 64 y.o. male who is seen today for an initial psychiatric evaluation by the addiction psychiatry consult service.  Nicholas Aiken presents with the chief complaint of: pre-transplant evaluation        HISTORY OF PRESENT ILLNESS     Per Primary MD:  Nicholas Aiken is a/an 64 y.o. male with Hep C, previous alcohol and narcotic abuse, cirrhosis 2/2 EtOH and Hep C s/p Harvoni, gastric varices admitted for hepatic encephalopathy and KELLI in setting of diarrhea. He is orient to self, but otherwise history unreliable from patient.      Per  note, " 64M with cirrhosis 2/2 EtOH and Hep C s/p Harvoni (GI: Dr Hernadez), prior EtOH dependence in remission (last drink 10/2019), last EGD 2016 with gastric varices, OA/chronic pain, h/o narcotic abuse on long term methadone 10 BID (per care Everywhere), tobacco smoking, admit to [facility] yesterday afternoon for decompensated cirrhosis and hepatic encephalopathy, and severe KELLI, in setting of diarrhea.       In ED: AMS/encephalopathy, worsening jaundice, Ammonia 212 (normal 19-60), TB 40, BUN 94/Cr 7.18 in patient with no significant renal Hx, K 2.2, INR 1.31, platelets 105, WBC 8.6, H/H ok. Abd U/S with doppler, exhibits normal hepatopedal flow and echogenic liver with nodular contour, no ascites, not enough fluid to tap (GI and radiology discussed). CXR wnl. UA wnl except for bili.  Got 2L IVFs bolus.  Started on lactulose TID, multiple BMs, repeated ammonia today and improved at 209.  GI consult added rifaximin today.  Labs unchanged today. K+ up to 2.8, last Cr improved by 2 points to 5.91. Nephrology and GI consulted.  Diarrhea - Stool Cx and C diff sent " "and pending.   It is uncertain if the KELLI is due to volume depletion due to profuse diarrhea or hepatorenal syndrome - however, he has already improved after crystalloid IVFs alone which may all be explained by volume depletion     Ongoing diarrhea today - 5 documented stools.  Asked about UOP, and he has had 4 urine episodes (not strict Is/Os).  Just had an episode of vomiting (nonbloody)  MELD-Na 36 (using some labs from today and some from yesterday which is not ideal albeit we know MELD is high given TB and Cr)"     Patient had C. Diff checked at OSH, however not performed as formed stool and thus did not meet criteria for testing     Per Addiction Psych MD:  On interview, the patient was confused and had trouble answering questions. He stated that he "felt like he was losing it and kept falling down" which prompted him to come to the hospital. The patient's food arrived and he terminated the interview so he could eat stating he "hadn't been able to eat in many days".     Prior to termination of the interview, he was participating, although he appeared confused- for example he at one point mentioned he was , while later mentioning that he was .  He also perseverated on not having drank for three months, long after the topic had changed.  He appeared frustrated when assessing for patient safety- when asking about SI/HI, the patient repeatedly stated, "I told you don't go there.  Not right now."      COLLATERAL  None     SUBSTANCE ABUSE HISTORY  Substance(s) of Choice: Patient refused to answer  Substances Used: Patient refused to answer  History of IVDU?: "A long time ago"  Use of Alcohol: Patient refused to answer  Average Consumption: Patient refused to answer  Last Drink: Patient stated that his last drink was 3 months ago  Use of Medications for Alcohol/Opioid Use Disorder: Patient reports to using methadone for "pain"   History of Complicated Withdrawal: Patient refused to answer  History of " "Detox: "once, a long time ago"  Rehab History: Patient refused to answer  AA/NA involvement: "went, didn't work"  Tobacco: 1/2 pack a day "all his life"  Spouse/Partner Consumption: Patient states he lives alone- later stated he "lives" in the [facility], raising suspicion for homelessness  Patient Aware of Biomedical Complications: Yes     DSM-5 SUBSTANCE USE DISORDER CRITERIA   Mild (1-3), Moderate (4-5), Severe (?6)  1. Often take in larger amounts or over a longer period of time than was intended.  2. Persistent desire or unsuccessful efforts to cut down or control use.  3. Great deal of time spent in activities necessary to obtain substance, use, or recover from effects.  4. Craving/strong desire for substance or urge to use.  5. Use resulting in failure to fulfill major role obligations at home, work or school.  6. Social, occupational, recreational activities decreased because of use.  7. Continued use despite having persistent or recurrent social or interpersonal problems cause or exaserbated by the substance.  8. Recurrent use in situations in which it is physically hazardous.  9. Use despite physical or psychological problems that are likely to have been caused or exacerbated by the substance.  10. Tolerance, as defined by either of the following.              A. A need for markedly increased amounts of substance to achieve intoxication or desired effect. -OR-               B. A markedly diminished effect with continued use of the same amount of substance.  11. Withdrawal, as manifested by the following.              A. The characteristic withdrawal syndrome for substance. -AND-              B. Substance is taken to relieve or avoid withdrawal symptoms.     ARE THE CRITERIA MET FOR DSM-5 SUBSTANCE USE DISORDER: Severe        TRANSPLANT EVALUATION  Date first informed of impending organ failure - Unable to assess  When was the subject of transplantation first broached - Unable to assess  Pt made the following " lifestyle adjustments and how - Unable to assess  Does the patient accept/understand the connection between substance use and subsequent organ failure - Unable to assess  Date of last use of substances - Unable to assess  Understands need for lifetime medication - Unable to assess  Understands need for lifetime sobriety - Unable to assess  View of AA/NA - Unable to assess  Social support - Unable to assess        Past Psychiatric History:  Previous Medication Trials: Unable to assess  Previous Psychiatric Hospitalizations: Unable to assess  Previous Suicide Attempts: Unable to assess  History of Violence: Unable to assess  Outpatient Psychiatrist: Unable to assess  Outpatient Psychotherapist: Unable to assess     Social History:  Marital Status:   Children: 1   Employment Status/Info: on disability  Education: Unable to assess  Special Ed: Unable to assess  Housing/Lives with: Unable to assess  History of phys/sexual abuse:Unable to assess  Access to gun: Unable to assess  Is the patient aware of the biomedical complications associated with substance abuse and mental illness? yes     Legal History:  Past Charges/Incarcerations:Unable to assess  Pending charges:Unable to assess     Family Psychiatric History:   Unable to assess     Psychosocial Stressors: health.   Functioning Relationships: Unable to assess     Psychosocial Factors:  Unable to assess due to patient encephalopathy and participation        Psychiatric Review of Systems     FULL ROS unable to assess due to patient participation     MEDICAL ROS     Complete review of systems performed covering Constitutional, Eyes, ENT/Mouth, Cardiovascular, Respiratory, Gastrointestinal, Genitourinary, Musculoskeletal, Skin, Neurologic, Endocrine, Heme/Lymph, and Allergy/Immune.      Complete review of systems was negative with the exception of the following positive symptoms: fatigue, diarrhea     PAST MEDICAL HISTORY        Active Ambulatory Problems      Diagnosis Date Noted    Carotid arterial disease 07/11/2011    Chronic pain associated with significant psychosocial dysfunction 07/11/2011    Dyslipidemia 01/09/2014    Cirrhosis 09/09/2013    Carrier of viral hepatitis C 07/11/2011    Hyperlipidemia 04/22/2013    BP (high blood pressure) 07/11/2011    RICHARDS (dyspnea on exertion) 02/18/2016    Tobacco abuse 02/18/2016    Venous insufficiency 05/19/2016    Macrocytic anemia 05/30/2017           Resolved Ambulatory Problems     Diagnosis Date Noted    No Resolved Ambulatory Problems           Past Medical History:   Diagnosis Date    Anxiety      Hepatitis C      Hypertension      Testosterone deficiency           ALLERGIES  Patient has no known allergies.        MEDICATIONS     Psychotropics:  Ativan 1 mg q4hr PRN     Infusions:   custom IV infusion builder (for pharmacist use only) 75 mL/hr at 07/21/20 0213        Scheduled:   cefTRIAXone  2 g Intravenous Q24H    folic acid  1 mg Oral Daily    lactulose  45 g Oral TID    pantoprazole  40 mg Oral Daily    rifAXIMin  550 mg Oral BID    thiamine  100 mg Oral Daily    zinc sulfate  220 mg Oral Daily        PRN:  acetaminophen, albuterol-ipratropium, dextrose 50%, dextrose 50%, glucagon (human recombinant), glucose, glucose, LORazepam, melatonin, ondansetron, prochlorperazine, sodium chloride 0.9%     Home Medications:          Prior to Admission medications    Medication Sig Start Date End Date Taking? Authorizing Provider   baclofen (LIORESAL) 20 MG tablet Take 20 mg by mouth 3 (three) times daily. BACLOFEN 20 MG TABS 7/11/11     Historical Provider, MD   docusate sodium (COLACE) 100 MG capsule Take 200 mg by mouth as directed.        Historical Provider, MD   doxepin (SINEQUAN) 10 MG capsule Take 10 mg by mouth every evening.       Historical Provider, MD   fenofibrate 160 MG Tab TAKE 1 TABLET EVERY DAY 7/19/17     Rhonda Frey MD   hydrocodone-acetaminophen 10-325mg (NORCO)  mg Tab  HYDROCODONE-ACETAMINOPHEN  MG TABS 4/22/13     Historical Provider, MD   lisinopril (PRINIVIL,ZESTRIL) 40 MG tablet TAKE 1 TABLET EVERY DAY 10/17/17     Rhonda Frey MD   melatonin 3 mg Tab Take 3 mg by mouth once daily. [facility] MELATONIN 3 MG TABS 2/29/12     Historical Provider, MD   methadone (DOLOPHINE) 10 MG tablet Take 10 mg by mouth 2 (two) times a day. METHADONE HCL 10 MG TABS 7/11/11     Historical Provider, MD   niacin (NIASPAN) 750 mg TbSR Take 750 mg by mouth as directed.        Historical Provider, MD   pantoprazole (PROTONIX) 40 MG tablet Take 1 tablet (40 mg total) by mouth once daily. 10/17/16 10/17/17   Rhonda Frey MD   potassium 99 mg Tab Take 99 mg by mouth once daily.       Historical Provider, MD   propranolol (INDERAL) 20 MG tablet TAKE 1 TABLET EVERY 8 HOURS AS DIRECTED  12/5/16     Rhonad Frey MD   propranolol (INDERAL) 20 MG tablet TAKE 1 TABLET EVERY 8 HOURS AS DIRECTED 3/10/17     Rhonda Frey MD   propranolol (INDERAL) 20 MG tablet TAKE 1 TABLET EVERY 8 HOURS AS DIRECTED 7/3/17     Isaura Luzt MD   spironolactone (ALDACTONE) 50 MG tablet TAKE 1 TABLET EVERY DAY 10/4/17     Rhonda Frey MD   testosterone cypionate (DEPOTESTOTERONE CYPIONATE) 200 mg/mL injection Inject into the muscle every 14 (fourteen) days. TESTOSTERONE CYPIONATE 200 MG/ML SOLN 4/22/13     Historical Provider, MD

## 2020-07-21 NOTE — ASSESSMENT & PLAN NOTE
"ASSESSMENT:      DIAGNOSES & PROBLEMS     etOH use disorder, severe  History of methadone use  EMR documentation of "Narcotic Abuse"     STRENGTHS AND LIABILITIES   Strength: Patient is motivated for change., Strength: Patient has reasonable judgment., Liability: Patient is defensive., Liability: Patient has poor health.        MOTIVATION TO PURSUE RECOVERY: unable to assess     ACCEPTANCE OF ADDICTION: fair     ABILITY TO ADHERE TO TREATMENT PLAN: questionable        PLAN:         TRANSPLANT SUITABILITY  From a psychiatric perspective, this patient presents as a high risk for transplant, especially given his last etOH use being at some point in the past 90 days.  The patient is encephalopathic at the moment and is not willing to discuss treatment options at this time.  This may be due to encephalopathy, but the appropriate environment to address his etOH use would be rehab vs. IOP and 12 step meetings.   aware confirms Methadone 10 mg dose.          MANAGEMENT PLAN, TREATMENT GOALS, THERAPEUTIC TECHNIQUES/APPROACHES & CLINICAL REASONING     --Methadone management per primary team- patient states that he receives this from pain clinic  --Patient should pursue IOP vs Rehab to address etOH use, can bolster efforts with 12-step meetings  --Minimal concerns for withdrawal  --Patient would not discuss further psychiatric history when being evaluated        --Patient counseled on abstinence from alcohol and substances of abuse (illicit and prescription).  --Additional workup planned to address substance use disorder, in order to guide and refine ongoing management options, includes serial alcohol and drug laboratory testing (e.g. PETH, urine toxicology).  --Relapse prevention and motivational interviewing provided.  --Education provided on 12 step recovery programs.        PRESCRIPTION DRUG MANAGEMENT  - The risks and benefits of medication were discussed with this patient.  - Possible expectable adverse effects of any " current or proposed individual psychotropic agents were discussed with this patient.  - Counseling was provided on the importance of full compliance with medication regimens.        In cases of emergency, daily coverage provided by Acute/ER Psych MD, NP, or SW, with contact numbers located in [facility] On Call Schedule     Case discussed with staff addiction psychiatrist: MD Lawrence PIERRE MD  \[facility]\""-[facility], PGY-2     Labs/Imaging/Studies:   Recent Results[]Expand by Default         Recent Results (from the past 24 hour(s))   Renal function panel     Collection Time: 07/20/20  9:53 AM   Result Value Ref Range     Glucose 70 70 - 110 mg/dL     Sodium 142 136 - 145 mmol/L     Potassium 3.0 (L) 3.5 - 5.1 mmol/L     Chloride 118 (H) 95 - 110 mmol/L     CO2 12 (L) 23 - 29 mmol/L     BUN, Bld 72 (H) 8 - 23 mg/dL     Calcium 8.7 8.7 - 10.5 mg/dL     Creatinine 4.7 (H) 0.5 - 1.4 mg/dL     Albumin 1.8 (L) 3.5 - 5.2 g/dL     Phosphorus 2.8 2.7 - 4.5 mg/dL     eGFR if  14.2 (A) >60 mL/min/1.73 m^2     eGFR if non  12.3 (A) >60 mL/min/1.73 m^2     Anion Gap 12 8 - 16 mmol/L   ISTAT PROCEDURE     Collection Time: 07/20/20 11:35 AM   Result Value Ref Range     POC PH 7.313 (L) 7.35 - 7.45     POC PCO2 21.6 (L) 35 - 45 mmHg     POC PO2 77 (HH) 40 - 60 mmHg     POC HCO3 10.9 (L) 24 - 28 mmol/L     POC BE -15 -2 to 2 mmol/L     POC SATURATED O2 94 (L) 95 - 100 %     POC TCO2 12 (L) 24 - 29 mmol/L     Sample VENOUS       Site Other       Allens Test N/A       DelSys Room Air       Mode SPONT       FiO2 21     Sodium, urine, random     Collection Time: 07/20/20 12:40 PM   Result Value Ref Range     Sodium Urine Random 79 20 - 250 mmol/L   Creatinine, urine, random     Collection Time: 07/20/20 12:40 PM   Result Value Ref Range     Creatinine, Random Ur 66.0 23.0 - 375.0 mg/dL   Protein / creatinine ratio, urine     Collection Time: 07/20/20 12:40 PM    Result Value Ref Range     Protein, Urine Random 13 0 - 15 mg/dL     Creatinine, Random Ur 66.0 23.0 - 375.0 mg/dL     Prot/Creat Ratio, Ur 0.20 0.00 - 0.20   Urea nitrogen, urine     Collection Time: 07/20/20 12:40 PM   Result Value Ref Range     Urine Urea Nitrogen, Random 396 140 - 1050 mg/dL   Urinalysis, Reflex to Urine Culture Urine, Clean Catch     Collection Time: 07/20/20 12:41 PM     Specimen: Urine   Result Value Ref Range     Specimen UA Urine, Catheterized       Color, UA Chelsy Yellow, Straw, Chelsy     Appearance, UA Clear Clear     pH, UA 6.0 5.0 - 8.0     Specific Gravity, UA 1.010 1.005 - 1.030     Protein, UA Negative Negative     Glucose, UA Negative Negative     Ketones, UA Negative Negative     Bilirubin (UA) 2+ (A) Negative     Occult Blood UA Negative Negative     Nitrite, UA Negative Negative     Leukocytes, UA Negative Negative   Mcfarlane's Stain, Urine Random     Collection Time: 07/20/20 12:41 PM   Result Value Ref Range     Mcfarlane's Stain, Ur No eosinophils seen No eosinophils seen   Comprehensive metabolic panel     Collection Time: 07/20/20  4:00 PM   Result Value Ref Range     Sodium 141 136 - 145 mmol/L     Potassium 3.4 (L) 3.5 - 5.1 mmol/L     Chloride 117 (H) 95 - 110 mmol/L     CO2 12 (L) 23 - 29 mmol/L     Glucose 82 70 - 110 mg/dL     BUN, Bld 69 (H) 8 - 23 mg/dL     Creatinine 4.6 (H) 0.5 - 1.4 mg/dL     Calcium 8.8 8.7 - 10.5 mg/dL     Total Protein 6.4 6.0 - 8.4 g/dL     Albumin 2.4 (L) 3.5 - 5.2 g/dL     Total Bilirubin 33.3 (H) 0.1 - 1.0 mg/dL     Alkaline Phosphatase 164 (H) 55 - 135 U/L     AST 87 (H) 10 - 40 U/L     ALT 33 10 - 44 U/L     Anion Gap 12 8 - 16 mmol/L     eGFR if  14.6 (A) >60 mL/min/1.73 m^2     eGFR if non African American 12.6 (A) >60 mL/min/1.73 m^2   Magnesium     Collection Time: 07/21/20  6:45 AM   Result Value Ref Range     Magnesium 1.5 (L) 1.6 - 2.6 mg/dL   Phosphorus     Collection Time: 07/21/20  6:45 AM   Result Value Ref Range      Phosphorus 1.6 (L) 2.7 - 4.5 mg/dL   CBC with Automated Differential     Collection Time: 07/21/20  6:45 AM   Result Value Ref Range     WBC 10.29 3.90 - 12.70 K/uL     RBC 3.37 (L) 4.60 - 6.20 M/uL     Hemoglobin 11.0 (L) 14.0 - 18.0 g/dL     Hematocrit 32.7 (L) 40.0 - 54.0 %     Mean Corpuscular Volume 97 82 - 98 fL     Mean Corpuscular Hemoglobin 32.6 (H) 27.0 - 31.0 pg     Mean Corpuscular Hemoglobin Conc 33.6 32.0 - 36.0 g/dL     RDW 18.6 (H) 11.5 - 14.5 %     Platelets 100 (L) 150 - 350 K/uL     MPV SEE COMMENT 9.2 - 12.9 fL     Immature Granulocytes 3.1 (H) 0.0 - 0.5 %     Gran # (ANC) 8.4 (H) 1.8 - 7.7 K/uL     Immature Grans (Abs) 0.32 (H) 0.00 - 0.04 K/uL     Lymph # 0.7 (L) 1.0 - 4.8 K/uL     Mono # 0.7 0.3 - 1.0 K/uL     Eos # 0.1 0.0 - 0.5 K/uL     Baso # 0.09 0.00 - 0.20 K/uL     nRBC 1 (A) 0 /100 WBC     Gran% 81.7 (H) 38.0 - 73.0 %     Lymph% 6.9 (L) 18.0 - 48.0 %     Mono% 6.7 4.0 - 15.0 %     Eosinophil% 0.7 0.0 - 8.0 %     Basophil% 0.9 0.0 - 1.9 %     Differential Method Automated     Comprehensive Metabolic Panel (CMP)     Collection Time: 07/21/20  6:45 AM   Result Value Ref Range     Sodium 147 (H) 136 - 145 mmol/L     Potassium 2.8 (L) 3.5 - 5.1 mmol/L     Chloride 117 (H) 95 - 110 mmol/L     CO2 18 (L) 23 - 29 mmol/L     Glucose 89 70 - 110 mg/dL     BUN, Bld 61 (H) 8 - 23 mg/dL     Creatinine 4.4 (H) 0.5 - 1.4 mg/dL     Calcium 9.2 8.7 - 10.5 mg/dL     Total Protein 6.2 6.0 - 8.4 g/dL     Albumin 2.5 (L) 3.5 - 5.2 g/dL     Total Bilirubin 31.4 (H) 0.1 - 1.0 mg/dL     Alkaline Phosphatase 146 (H) 55 - 135 U/L     AST 68 (H) 10 - 40 U/L     ALT 28 10 - 44 U/L     Anion Gap 12 8 - 16 mmol/L     eGFR if African American 15.4 (A) >60 mL/min/1.73 m^2     eGFR if non  13.3 (A) >60 mL/min/1.73 m^2   PT/INR     Collection Time: 07/21/20  6:45 AM   Result Value Ref Range     Prothrombin Time 13.8 (H) 9.0 - 12.5 sec     INR 1.3 (H) 0.8 - 1.2   Lactic acid, plasma     Collection  Time: 07/21/20  6:45 AM   Result Value Ref Range     Lactate (Lactic Acid) 0.8 0.5 - 2.2 mmol/L   Prealbumin     Collection Time: 07/21/20  6:45 AM   Result Value Ref Range     Prealbumin 8 (L) 20 - 43 mg/dL

## 2020-07-21 NOTE — PT/OT/SLP EVAL
Occupational Therapy   Evaluation    Name: Nicholas Aiken  MRN: [id number]  Admitting Diagnosis:  Acute liver failure      Recommendations:     Discharge Recommendations: nursing facility, skilled((may progress to )) - limited assessment.  Discharge Equipment Recommendations:  (TBD)  Barriers to discharge:  Decreased caregiver support    Assessment:     Nicholas Aiken is a 64 y.o. male with a medical diagnosis of Acute liver failure. Limited assessment completed as pt having uncontrollable BMs so only able to observe rolling in bed and MMT. Cleaned pt initially then he started having another BM so rest of evaluation terminated. Will complete assessment at next session. Performance deficits affecting function: weakness, impaired endurance, impaired self care skills, impaired functional mobilty, gait instability, impaired balance, impaired cognition, decreased coordination, decreased safety awareness.      Rehab Prognosis: Good; patient would benefit from acute skilled OT services to address these deficits and reach maximum level of function.       Plan:     Patient to be seen 4 x/week to address the above listed problems via self-care/home management, therapeutic activities, therapeutic exercises  · Plan of Care Expires: 08/20/20  · Plan of Care Reviewed with: patient    Subjective     Occupational Profile: Pt is an unreliable historian - conflicting answers.  Living Environment: Lives in a 2SH - steps to go upstairs.  Previous level of function: Independent  Equipment Used at Home:  shower chair, cane, straight  Assistance upon Discharge: Mother?    Pain/Comfort:  · Pain Rating 1: 0/10    Patients cultural, spiritual, Hinduism conflicts given the current situation:      Objective:     Communicated with: rn prior to session.  Patient found supine with peripheral IV, issa catheter upon OT entry to room.    General Precautions: Standard, fall     Occupational Performance:    Bed Mobility:    · Patient completed  Rolling/Turning to Left with  minimum assistance  · Patient completed Rolling/Turning to Right with minimum assistance    Functional Mobility/Transfers:  Not assessed.     Activities of Daily Living:  Toileting: Total A     Cognitive/Visual Perceptual:  Cognitive/Psychosocial Skills:     -       Oriented to: Person, Place, Time and Situation   -       Safety awareness/insight to disability: impaired     Physical Exam:  BUE AROM/MMT: WNL    AMPAC 6 Click ADL:  AMPAC Total Score: 14    Treatment & Education:  UE ROM/MMT  Bed mobility assessment  Discussed OT POC / Post-acute plan  Education:    Patient left supine with all lines intact and call button in reach    GOALS:   Multidisciplinary Problems     Occupational Therapy Goals        Problem: Occupational Therapy Goal    Goal Priority Disciplines Outcome Interventions   Occupational Therapy Goal     OT, PT/OT Ongoing, Progressing    Description: Goals to be met by: 7/28/20     Patient will increase functional independence with ADLs by performing:    UE Dressing with Stand-by Assistance.  LE Dressing with Moderate Assistance.  Grooming while EOB with Stand-by Assistance.  Toileting from bedside commode with Minimal Assistance for hygiene and clothing management.   Rolling to Bilateral with Supervision.   Supine to sit with Minimal Assistance.  Toilet transfer to bedside commode with Minimal Assistance.                     History:     Past Medical History:   Diagnosis Date    Anxiety     Hepatitis C     Hypertension     Testosterone deficiency        Past Surgical History:   Procedure Laterality Date    ear drum replaced      fisure      rgd         Time Tracking:     OT Date of Treatment: 07/21/20  OT Start Time: 1129  OT Stop Time: 1149  OT Total Time (min): 20 min    Billable Minutes:Evaluation 10  Therapeutic Activity 10    BRIAN Diaz  7/21/2020

## 2020-07-21 NOTE — MEDICAL/APP STUDENT
"7/21/2020 9:41 AM  Nicholas Aiken  1955  [id number]      ADDICTION CONSULT INITIAL EVALUATION     DEPARTMENT:  Psychiatry  SITE: [facility], [address]    DATE OF ADMISSION: 7/20/2020 12:38 AM  LENGTH OF STAY: 1 days    EXAMINING PRACTITIONER: Lawrence Amador MD    CONSULT REQUESTED BY: Jesus Thacker MD      SUBJECTIVE     CHIEF COMPLAINT  Nicholas Aiken is a 64 y.o. male who is seen today for an initial psychiatric evaluation by the addiction psychiatry consult service.  Nicholas Aiken presents with the chief complaint of: pre-transplant evaluation      HISTORY OF PRESENT ILLNESS    Per Primary MD:  Nicholas Aiken is a/an 64 y.o. male with Hep C, previous alcohol and narcotic abuse, cirrhosis 2/2 EtOH and Hep C s/p Harvoni, gastric varices admitted for hepatic encephalopathy and KELLI in setting of diarrhea. He is orient to self, but otherwise history unreliable from patient.      Per  note, " 64M with cirrhosis 2/2 EtOH and Hep C s/p Harvoni (GI: Dr Hernadez), prior EtOH dependence in remission (last drink 10/2019), last EGD 2016 with gastric varices, OA/chronic pain, h/o narcotic abuse on long term methadone 10 BID (per care Everywhere), tobacco smoking, admit to [facility] yesterday afternoon for decompensated cirrhosis and hepatic encephalopathy, and severe KELLI, in setting of diarrhea.       In ED: AMS/encephalopathy, worsening jaundice, Ammonia 212 (normal 19-60), TB 40, BUN 94/Cr 7.18 in patient with no significant renal Hx, K 2.2, INR 1.31, platelets 105, WBC 8.6, H/H ok. Abd U/S with doppler, exhibits normal hepatopedal flow and echogenic liver with nodular contour, no ascites, not enough fluid to tap (GI and radiology discussed). CXR wnl. UA wnl except for bili.  Got 2L IVFs bolus.  Started on lactulose TID, multiple BMs, repeated ammonia today and improved at 209.  GI consult added rifaximin today.  Labs unchanged today. K+ up to 2.8, last Cr improved by 2 points to 5.91. Nephrology " "and GI consulted.  Diarrhea - Stool Cx and C diff sent and pending.   It is uncertain if the KELLI is due to volume depletion due to profuse diarrhea or hepatorenal syndrome - however, he has already improved after crystalloid IVFs alone which may all be explained by volume depletion     Ongoing diarrhea today - 5 documented stools.  Asked about UOP, and he has had 4 urine episodes (not strict Is/Os).  Just had an episode of vomiting (nonbloody)  MELD-Na 36 (using some labs from today and some from yesterday which is not ideal albeit we know MELD is high given TB and Cr)"     Patient had C. Diff checked at OSH, however not performed as formed stool and thus did not meet criteria for testing.    Per Addiction Psych MD:   On interview, the patient was confused and had trouble answering questions. He stated that he "felt like he was losing it and kept falling down" which prompted him to come to the hospital. The patient's food arrived and he terminated the interview so he could eat stating he "hadn't been able to eat in many days".     COLLATERAL  Patient refused to answer    SUBSTANCE ABUSE HISTORY  Substance(s) of Choice: Patient refused to answer  Substances Used: Patient refused to answer  History of IVDU?: "A long time ago"  Use of Alcohol: Patient refused to answer  Average Consumption: Patient refused to answer  Last Drink: Patient stated that his last drink was 3 months ago  Use of Medications for Alcohol/Opioid Use Disorder: Patient reports to using methadone for "pain"   History of Complicated Withdrawal: Patient refused to answer  History of Detox: "once, a long time ago"  Rehab History: Patient refused to answer  AA/NA involvement: "went, didn't work"  Tobacco: 1/2 pack a day "all his life"  Spouse/Partner Consumption: Patient states he lives alone  Patient Aware of Biomedical Complications: Yes    DSM-5 SUBSTANCE USE DISORDER CRITERIA   Mild (1-3), Moderate (4-5), Severe (?6)  1. Often take in larger amounts " or over a longer period of time than was intended.  2. Persistent desire or unsuccessful efforts to cut down or control use.  3. Great deal of time spent in activities necessary to obtain substance, use, or recover from effects.  4. Craving/strong desire for substance or urge to use.  5. Use resulting in failure to fulfill major role obligations at home, work or school.  6. Social, occupational, recreational activities decreased because of use.  7. Continued use despite having persistent or recurrent social or interpersonal problems cause or exaserbated by the substance.  8. Recurrent use in situations in which it is physically hazardous.  9. Use despite physical or psychological problems that are likely to have been caused or exacerbated by the substance.  10. Tolerance, as defined by either of the following.   A. A need for markedly increased amounts of substance to achieve intoxication or desired effect. -OR-    B. A markedly diminished effect with continued use of the same amount of substance.  11. Withdrawal, as manifested by the following.   A. The characteristic withdrawal syndrome for substance. -AND-   B. Substance is taken to relieve or avoid withdrawal symptoms.    ARE THE CRITERIA MET FOR DSM-5 SUBSTANCE USE DISORDER: Severe      TRANSPLANT EVALUATION  Date first informed of impending organ failure - ***  When was the subject of transplantation first broached - ***  Pt made the following lifestyle adjustments and how - ***  Does the patient accept/understand the connection between substance use and subsequent organ failure - ***  Date of last use of substances - ***  Understands need for lifetime medication - ***  Understands need for lifetime sobriety - ***  View of AA/NA - ***  Social support - ***      PSYCHIATRIC HISTORY  Reported Diagnose(s): ***  Previous Medication Trials: ***  Previous Psychiatric Hospitalizations: ***  Outpatient Psychiatrist?: ***      SUICIDE/HOMICIDE RISK  ASSESSMENT  Current/active suicidal ideation/plan/intent: ***  Previous suicide attempts: ***  Current/active homicidal ideation/plan/intent: ***      HISTORY OF TRAUMA, ABUSE & VIOLENCE  Trauma: ***  Physical Abuse: ***  Sexual Abuse: ***  Violent Conduct: ***    Access to Gun: ***       FAMILY PSYCHIATRIC HISTORY   ***       SOCIAL HISTORY  ***      PAST MEDICAL HISTORY   ***      PSYCHOSOCIAL FACTORS  Stressors (Psychosocial and Environmental): { :79205}.       PSYCHIATRIC ROS  ***    MEDICAL ROS    Complete review of systems performed covering Constitutional, Eyes, ENT/Mouth, Cardiovascular, Respiratory, Gastrointestinal, Genitourinary, Musculoskeletal, Skin, Neurologic, Endocrine, Heme/Lymph, and Allergy/Immune.     Complete review of systems was negative with the exception of the following positive symptoms: jaundice, scleral icterus, tremor and asterixis, confusion      ALLERGIES  Patient has no known allergies.      MEDICATIONS    Psychotropics:  ***    Infusions:   custom IV infusion builder (for pharmacist use only) 75 mL/hr at 07/21/20 0213       Scheduled:   folic acid  1 mg Oral Daily    heparin (porcine)  5,000 Units Subcutaneous Q12H    lactulose  45 g Oral TID    magnesium sulfate  2 g Intravenous Q2H    methadone  10 mg Oral Q12H    pantoprazole  40 mg Oral Daily    potassium chloride  40 mEq Oral Q4H    potassium phosphate IVPB  20 mmol Intravenous Once    rifAXIMin  550 mg Oral BID    thiamine  100 mg Oral Daily    zinc sulfate  220 mg Oral Daily       PRN:  acetaminophen, albuterol-ipratropium, dextrose 50%, dextrose 50%, glucagon (human recombinant), glucose, glucose, LORazepam, melatonin, ondansetron, prochlorperazine, sodium chloride 0.9%    Home Medications:  Prior to Admission medications    Medication Sig Start Date End Date Taking? Authorizing Provider   baclofen (LIORESAL) 20 MG tablet Take 20 mg by mouth 3 (three) times daily. BACLOFEN 20 MG TABS 7/11/11   Historical  Provider, MD   docusate sodium (COLACE) 100 MG capsule Take 200 mg by mouth as directed.     Historical Provider, MD   doxepin (SINEQUAN) 10 MG capsule Take 10 mg by mouth every evening.    Historical Provider, MD   fenofibrate 160 MG Tab TAKE 1 TABLET EVERY DAY 7/19/17   Rhonda Frey MD   hydrocodone-acetaminophen 10-325mg (NORCO)  mg Tab HYDROCODONE-ACETAMINOPHEN  MG TABS 4/22/13   Historical Provider, MD   lisinopril (PRINIVIL,ZESTRIL) 40 MG tablet TAKE 1 TABLET EVERY DAY 10/17/17   Rhonda Frey MD   melatonin 3 mg Tab Take 3 mg by mouth once daily. [facility] MELATONIN 3 MG TABS 2/29/12   Historical Provider, MD   methadone (DOLOPHINE) 10 MG tablet Take 10 mg by mouth 2 (two) times a day. METHADONE HCL 10 MG TABS 7/11/11   Historical Provider, MD   niacin (NIASPAN) 750 mg TbSR Take 750 mg by mouth as directed.     Historical Provider, MD   pantoprazole (PROTONIX) 40 MG tablet Take 1 tablet (40 mg total) by mouth once daily. 10/17/16 10/17/17  Rhonda Frey MD   potassium 99 mg Tab Take 99 mg by mouth once daily.    Historical Provider, MD   propranolol (INDERAL) 20 MG tablet TAKE 1 TABLET EVERY 8 HOURS AS DIRECTED  12/5/16   Rhonda Frey MD   propranolol (INDERAL) 20 MG tablet TAKE 1 TABLET EVERY 8 HOURS AS DIRECTED 3/10/17   Rhonda Frey MD   propranolol (INDERAL) 20 MG tablet TAKE 1 TABLET EVERY 8 HOURS AS DIRECTED 7/3/17   Isaura Lutz MD   spironolactone (ALDACTONE) 50 MG tablet TAKE 1 TABLET EVERY DAY 10/4/17   Rhonda Frey MD   testosterone cypionate (DEPOTESTOTERONE CYPIONATE) 200 mg/mL injection Inject into the muscle every 14 (fourteen) days. TESTOSTERONE CYPIONATE 200 MG/ML SOLN 4/22/13   Historical Provider, MD         OBJECTIVE:     EXAMINATION    Vitals:    07/20/20 2000 07/20/20 2300 07/21/20 0400 07/21/20 0745   BP: (!) 156/90 (!) 161/87 (!) 155/87 (!) 159/91   BP Location:       Patient Position:       Pulse: 87 68 61 68   Resp:  20 18 (!) 25   Temp: 98.2 °F (36.8 °C)  98.1 °F (36.7 °C) 98 °F (36.7 °C) 99.1 °F (37.3 °C)   TempSrc: Oral Oral Oral Oral   SpO2: 98% 98% 95% 97%   Weight:   94 kg (207 lb 4.8 oz)    Height:           PAIN   ***/10    CONSTITUTIONAL  General Appearance and Manner: ***    MUSCULOSKELETAL  Abnormal Involuntary Movements: ***  Gait and Station: ***    PSYCHIATRIC   Orientation: ***  Speech: ***  Language: ***  Mood: ***  Affect: ***  Thought Process: ***  Associations: ***  Thought Content: ***  Memory: ***  Attention and Concentration: ***  Fund of Knowledge: ***  Insight: ***  Judgment: ***      ASSESSMENT:     DIAGNOSES & PROBLEMS    ***      STRENGTHS AND LIABILITIES   {PSY STRENGTHS/LIABILITIES:20514}      MOTIVATION TO PURSUE RECOVERY: {desc; high/low:29406}    ACCEPTANCE OF ADDICTION: {Progress:20262}    ABILITY TO ADHERE TO TREATMENT PLAN: {desc; high/low:38361}      PLAN:       TRANSPLANT SUITABILITY  From a psychiatric perspective, this patient presents as a *** risk for transplant.      MANAGEMENT PLAN, TREATMENT GOALS, THERAPEUTIC TECHNIQUES/APPROACHES & CLINICAL REASONING    ***      · Patient counseled on abstinence from alcohol and substances of abuse (illicit and prescription).  · Additional workup planned to address substance use disorder, in order to guide and refine ongoing management options, includes serial alcohol and drug laboratory testing (e.g. PETH, urine toxicology).  · Relapse prevention and motivational interviewing provided.  · Education provided on 12 step recovery programs.      PRESCRIPTION DRUG MANAGEMENT  - The risks and benefits of medication were discussed with this patient.  - Possible expectable adverse effects of any current or proposed individual psychotropic agents were discussed with this patient.  - Counseling was provided on the importance of full compliance with medication regimens.      In cases of emergency, daily coverage provided by Acute/ER Psych MD, NP, or SW, with contact numbers located in [facility]  [facility] On Call Schedule    Case discussed with staff addiction psychiatrist: PIPPA DONALD***, MD      Labs/Imaging/Studies:   Recent Results (from the past 24 hour(s))   Renal function panel    Collection Time: 07/20/20  9:53 AM   Result Value Ref Range    Glucose 70 70 - 110 mg/dL    Sodium 142 136 - 145 mmol/L    Potassium 3.0 (L) 3.5 - 5.1 mmol/L    Chloride 118 (H) 95 - 110 mmol/L    CO2 12 (L) 23 - 29 mmol/L    BUN, Bld 72 (H) 8 - 23 mg/dL    Calcium 8.7 8.7 - 10.5 mg/dL    Creatinine 4.7 (H) 0.5 - 1.4 mg/dL    Albumin 1.8 (L) 3.5 - 5.2 g/dL    Phosphorus 2.8 2.7 - 4.5 mg/dL    eGFR if  14.2 (A) >60 mL/min/1.73 m^2    eGFR if non  12.3 (A) >60 mL/min/1.73 m^2    Anion Gap 12 8 - 16 mmol/L   ISTAT PROCEDURE    Collection Time: 07/20/20 11:35 AM   Result Value Ref Range    POC PH 7.313 (L) 7.35 - 7.45    POC PCO2 21.6 (L) 35 - 45 mmHg    POC PO2 77 (HH) 40 - 60 mmHg    POC HCO3 10.9 (L) 24 - 28 mmol/L    POC BE -15 -2 to 2 mmol/L    POC SATURATED O2 94 (L) 95 - 100 %    POC TCO2 12 (L) 24 - 29 mmol/L    Sample VENOUS     Site Other     Allens Test N/A     DelSys Room Air     Mode SPONT     FiO2 21    Sodium, urine, random    Collection Time: 07/20/20 12:40 PM   Result Value Ref Range    Sodium Urine Random 79 20 - 250 mmol/L   Creatinine, urine, random    Collection Time: 07/20/20 12:40 PM   Result Value Ref Range    Creatinine, Random Ur 66.0 23.0 - 375.0 mg/dL   Protein / creatinine ratio, urine    Collection Time: 07/20/20 12:40 PM   Result Value Ref Range    Protein, Urine Random 13 0 - 15 mg/dL    Creatinine, Random Ur 66.0 23.0 - 375.0 mg/dL    Prot/Creat Ratio, Ur 0.20 0.00 - 0.20   Urea nitrogen, urine    Collection Time: 07/20/20 12:40 PM   Result Value Ref Range    Urine Urea Nitrogen, Random 396 140 - 1050 mg/dL   Urinalysis, Reflex to Urine Culture Urine, Clean Catch    Collection Time: 07/20/20 12:41 PM    Specimen: Urine   Result Value Ref Range    Specimen UA  Urine, Catheterized     Color, UA Chelsy Yellow, Straw, Chelsy    Appearance, UA Clear Clear    pH, UA 6.0 5.0 - 8.0    Specific Gravity, UA 1.010 1.005 - 1.030    Protein, UA Negative Negative    Glucose, UA Negative Negative    Ketones, UA Negative Negative    Bilirubin (UA) 2+ (A) Negative    Occult Blood UA Negative Negative    Nitrite, UA Negative Negative    Leukocytes, UA Negative Negative   Mcfarlane's Stain, Urine Random    Collection Time: 07/20/20 12:41 PM   Result Value Ref Range    Mcfarlane's Stain, Ur No eosinophils seen No eosinophils seen   Comprehensive metabolic panel    Collection Time: 07/20/20  4:00 PM   Result Value Ref Range    Sodium 141 136 - 145 mmol/L    Potassium 3.4 (L) 3.5 - 5.1 mmol/L    Chloride 117 (H) 95 - 110 mmol/L    CO2 12 (L) 23 - 29 mmol/L    Glucose 82 70 - 110 mg/dL    BUN, Bld 69 (H) 8 - 23 mg/dL    Creatinine 4.6 (H) 0.5 - 1.4 mg/dL    Calcium 8.8 8.7 - 10.5 mg/dL    Total Protein 6.4 6.0 - 8.4 g/dL    Albumin 2.4 (L) 3.5 - 5.2 g/dL    Total Bilirubin 33.3 (H) 0.1 - 1.0 mg/dL    Alkaline Phosphatase 164 (H) 55 - 135 U/L    AST 87 (H) 10 - 40 U/L    ALT 33 10 - 44 U/L    Anion Gap 12 8 - 16 mmol/L    eGFR if African American 14.6 (A) >60 mL/min/1.73 m^2    eGFR if non African American 12.6 (A) >60 mL/min/1.73 m^2   Magnesium    Collection Time: 07/21/20  6:45 AM   Result Value Ref Range    Magnesium 1.5 (L) 1.6 - 2.6 mg/dL   Phosphorus    Collection Time: 07/21/20  6:45 AM   Result Value Ref Range    Phosphorus 1.6 (L) 2.7 - 4.5 mg/dL   CBC with Automated Differential    Collection Time: 07/21/20  6:45 AM   Result Value Ref Range    WBC 10.29 3.90 - 12.70 K/uL    RBC 3.37 (L) 4.60 - 6.20 M/uL    Hemoglobin 11.0 (L) 14.0 - 18.0 g/dL    Hematocrit 32.7 (L) 40.0 - 54.0 %    Mean Corpuscular Volume 97 82 - 98 fL    Mean Corpuscular Hemoglobin 32.6 (H) 27.0 - 31.0 pg    Mean Corpuscular Hemoglobin Conc 33.6 32.0 - 36.0 g/dL    RDW 18.6 (H) 11.5 - 14.5 %    Platelets 100 (L) 150 - 350  K/uL    MPV SEE COMMENT 9.2 - 12.9 fL    Immature Granulocytes 3.1 (H) 0.0 - 0.5 %    Gran # (ANC) 8.4 (H) 1.8 - 7.7 K/uL    Immature Grans (Abs) 0.32 (H) 0.00 - 0.04 K/uL    Lymph # 0.7 (L) 1.0 - 4.8 K/uL    Mono # 0.7 0.3 - 1.0 K/uL    Eos # 0.1 0.0 - 0.5 K/uL    Baso # 0.09 0.00 - 0.20 K/uL    nRBC 1 (A) 0 /100 WBC    Gran% 81.7 (H) 38.0 - 73.0 %    Lymph% 6.9 (L) 18.0 - 48.0 %    Mono% 6.7 4.0 - 15.0 %    Eosinophil% 0.7 0.0 - 8.0 %    Basophil% 0.9 0.0 - 1.9 %    Differential Method Automated    Comprehensive Metabolic Panel (CMP)    Collection Time: 07/21/20  6:45 AM   Result Value Ref Range    Sodium 147 (H) 136 - 145 mmol/L    Potassium 2.8 (L) 3.5 - 5.1 mmol/L    Chloride 117 (H) 95 - 110 mmol/L    CO2 18 (L) 23 - 29 mmol/L    Glucose 89 70 - 110 mg/dL    BUN, Bld 61 (H) 8 - 23 mg/dL    Creatinine 4.4 (H) 0.5 - 1.4 mg/dL    Calcium 9.2 8.7 - 10.5 mg/dL    Total Protein 6.2 6.0 - 8.4 g/dL    Albumin 2.5 (L) 3.5 - 5.2 g/dL    Total Bilirubin 31.4 (H) 0.1 - 1.0 mg/dL    Alkaline Phosphatase 146 (H) 55 - 135 U/L    AST 68 (H) 10 - 40 U/L    ALT 28 10 - 44 U/L    Anion Gap 12 8 - 16 mmol/L    eGFR if African American 15.4 (A) >60 mL/min/1.73 m^2    eGFR if non  13.3 (A) >60 mL/min/1.73 m^2   PT/INR    Collection Time: 07/21/20  6:45 AM   Result Value Ref Range    Prothrombin Time 13.8 (H) 9.0 - 12.5 sec    INR 1.3 (H) 0.8 - 1.2   Lactic acid, plasma    Collection Time: 07/21/20  6:45 AM   Result Value Ref Range    Lactate (Lactic Acid) 0.8 0.5 - 2.2 mmol/L   Prealbumin    Collection Time: 07/21/20  6:45 AM   Result Value Ref Range    Prealbumin 8 (L) 20 - 43 mg/dL

## 2020-07-22 LAB
ALBUMIN SERPL BCP-MCNC: 2.1 G/DL (ref 3.5–5.2)
ALBUMIN SERPL BCP-MCNC: 2.2 G/DL (ref 3.5–5.2)
ALP SERPL-CCNC: 146 U/L (ref 55–135)
ALP SERPL-CCNC: 156 U/L (ref 55–135)
ALT SERPL W/O P-5'-P-CCNC: 28 U/L (ref 10–44)
ALT SERPL W/O P-5'-P-CCNC: 29 U/L (ref 10–44)
ANION GAP SERPL CALC-SCNC: 8 MMOL/L (ref 8–16)
ANION GAP SERPL CALC-SCNC: 9 MMOL/L (ref 8–16)
AST SERPL-CCNC: 65 U/L (ref 10–40)
AST SERPL-CCNC: 68 U/L (ref 10–40)
BASOPHILS # BLD AUTO: 0.09 K/UL (ref 0–0.2)
BASOPHILS NFR BLD: 0.7 % (ref 0–1.9)
BILIRUB SERPL-MCNC: 24.8 MG/DL (ref 0.1–1)
BILIRUB SERPL-MCNC: 26.8 MG/DL (ref 0.1–1)
BUN SERPL-MCNC: 46 MG/DL (ref 8–23)
BUN SERPL-MCNC: 49 MG/DL (ref 8–23)
CALCIUM SERPL-MCNC: 8.7 MG/DL (ref 8.7–10.5)
CALCIUM SERPL-MCNC: 9 MG/DL (ref 8.7–10.5)
CHLORIDE SERPL-SCNC: 118 MMOL/L (ref 95–110)
CHLORIDE SERPL-SCNC: 119 MMOL/L (ref 95–110)
CO2 SERPL-SCNC: 19 MMOL/L (ref 23–29)
CO2 SERPL-SCNC: 20 MMOL/L (ref 23–29)
CREAT SERPL-MCNC: 3.4 MG/DL (ref 0.5–1.4)
CREAT SERPL-MCNC: 3.7 MG/DL (ref 0.5–1.4)
DIFFERENTIAL METHOD: ABNORMAL
EOSINOPHIL # BLD AUTO: 0.1 K/UL (ref 0–0.5)
EOSINOPHIL NFR BLD: 0.6 % (ref 0–8)
ERYTHROCYTE [DISTWIDTH] IN BLOOD BY AUTOMATED COUNT: 19.1 % (ref 11.5–14.5)
EST. GFR  (AFRICAN AMERICAN): 19 ML/MIN/1.73 M^2
EST. GFR  (AFRICAN AMERICAN): 21 ML/MIN/1.73 M^2
EST. GFR  (NON AFRICAN AMERICAN): 16.4 ML/MIN/1.73 M^2
EST. GFR  (NON AFRICAN AMERICAN): 18.2 ML/MIN/1.73 M^2
GLUCOSE SERPL-MCNC: 106 MG/DL (ref 70–110)
GLUCOSE SERPL-MCNC: 144 MG/DL (ref 70–110)
HBV SURFACE AB SER-ACNC: NEGATIVE M[IU]/ML
HBV SURFACE AG SERPL QL IA: NEGATIVE
HCT VFR BLD AUTO: 32.9 % (ref 40–54)
HEPATITIS A ANTIBODY, IGG: NEGATIVE
HGB BLD-MCNC: 10.9 G/DL (ref 14–18)
IMM GRANULOCYTES # BLD AUTO: 0.38 K/UL (ref 0–0.04)
IMM GRANULOCYTES NFR BLD AUTO: 2.9 % (ref 0–0.5)
INR PPP: 1.2 (ref 0.8–1.2)
LYMPHOCYTES # BLD AUTO: 0.8 K/UL (ref 1–4.8)
LYMPHOCYTES NFR BLD: 6.3 % (ref 18–48)
MAGNESIUM SERPL-MCNC: 2.1 MG/DL (ref 1.6–2.6)
MCH RBC QN AUTO: 31.9 PG (ref 27–31)
MCHC RBC AUTO-ENTMCNC: 33.1 G/DL (ref 32–36)
MCV RBC AUTO: 96 FL (ref 82–98)
MONOCYTES # BLD AUTO: 1 K/UL (ref 0.3–1)
MONOCYTES NFR BLD: 7.9 % (ref 4–15)
NEUTROPHILS # BLD AUTO: 10.6 K/UL (ref 1.8–7.7)
NEUTROPHILS NFR BLD: 81.6 % (ref 38–73)
NRBC BLD-RTO: 1 /100 WBC
PHOSPHATE SERPL-MCNC: 2 MG/DL (ref 2.7–4.5)
PLATELET # BLD AUTO: 100 K/UL (ref 150–350)
PMV BLD AUTO: ABNORMAL FL (ref 9.2–12.9)
POTASSIUM SERPL-SCNC: 3 MMOL/L (ref 3.5–5.1)
POTASSIUM SERPL-SCNC: 3.7 MMOL/L (ref 3.5–5.1)
PROT SERPL-MCNC: 5.8 G/DL (ref 6–8.4)
PROT SERPL-MCNC: 6.1 G/DL (ref 6–8.4)
PROTHROMBIN TIME: 13.5 SEC (ref 9–12.5)
RBC # BLD AUTO: 3.42 M/UL (ref 4.6–6.2)
SODIUM SERPL-SCNC: 146 MMOL/L (ref 136–145)
SODIUM SERPL-SCNC: 147 MMOL/L (ref 136–145)
WBC # BLD AUTO: 12.99 K/UL (ref 3.9–12.7)

## 2020-07-22 PROCEDURE — 97110 THERAPEUTIC EXERCISES: CPT

## 2020-07-22 PROCEDURE — 87340 HEPATITIS B SURFACE AG IA: CPT

## 2020-07-22 PROCEDURE — 25000003 PHARM REV CODE 250: Performed by: HOSPITALIST

## 2020-07-22 PROCEDURE — 99233 PR SUBSEQUENT HOSPITAL CARE,LEVL III: ICD-10-PCS | Mod: GC,,, | Performed by: INTERNAL MEDICINE

## 2020-07-22 PROCEDURE — 86790 VIRUS ANTIBODY NOS: CPT

## 2020-07-22 PROCEDURE — 84425 ASSAY OF VITAMIN B-1: CPT

## 2020-07-22 PROCEDURE — 63600175 PHARM REV CODE 636 W HCPCS: Performed by: HOSPITALIST

## 2020-07-22 PROCEDURE — 99233 SBSQ HOSP IP/OBS HIGH 50: CPT | Mod: ,,, | Performed by: HOSPITALIST

## 2020-07-22 PROCEDURE — 83735 ASSAY OF MAGNESIUM: CPT

## 2020-07-22 PROCEDURE — 94761 N-INVAS EAR/PLS OXIMETRY MLT: CPT

## 2020-07-22 PROCEDURE — 36415 COLL VENOUS BLD VENIPUNCTURE: CPT

## 2020-07-22 PROCEDURE — 97116 GAIT TRAINING THERAPY: CPT

## 2020-07-22 PROCEDURE — 99233 SBSQ HOSP IP/OBS HIGH 50: CPT | Mod: GC,,, | Performed by: INTERNAL MEDICINE

## 2020-07-22 PROCEDURE — 20600001 HC STEP DOWN PRIVATE ROOM

## 2020-07-22 PROCEDURE — 85025 COMPLETE CBC W/AUTO DIFF WBC: CPT

## 2020-07-22 PROCEDURE — 80053 COMPREHEN METABOLIC PANEL: CPT

## 2020-07-22 PROCEDURE — 86706 HEP B SURFACE ANTIBODY: CPT

## 2020-07-22 PROCEDURE — 97530 THERAPEUTIC ACTIVITIES: CPT

## 2020-07-22 PROCEDURE — 85610 PROTHROMBIN TIME: CPT

## 2020-07-22 PROCEDURE — 25000003 PHARM REV CODE 250: Performed by: INTERNAL MEDICINE

## 2020-07-22 PROCEDURE — 99233 PR SUBSEQUENT HOSPITAL CARE,LEVL III: ICD-10-PCS | Mod: ,,, | Performed by: HOSPITALIST

## 2020-07-22 PROCEDURE — 80053 COMPREHEN METABOLIC PANEL: CPT | Mod: 91

## 2020-07-22 PROCEDURE — 84100 ASSAY OF PHOSPHORUS: CPT

## 2020-07-22 RX ORDER — SODIUM,POTASSIUM PHOSPHATES 280-250MG
2 POWDER IN PACKET (EA) ORAL
Status: COMPLETED | OUTPATIENT
Start: 2020-07-22 | End: 2020-07-23

## 2020-07-22 RX ORDER — POTASSIUM CHLORIDE 20 MEQ/1
40 TABLET, EXTENDED RELEASE ORAL EVERY 4 HOURS
Status: COMPLETED | OUTPATIENT
Start: 2020-07-22 | End: 2020-07-22

## 2020-07-22 RX ADMIN — LACTULOSE 10 G: 20 SOLUTION ORAL at 03:07

## 2020-07-22 RX ADMIN — POTASSIUM & SODIUM PHOSPHATES POWDER PACK 280-160-250 MG 2 PACKET: 280-160-250 PACK at 05:07

## 2020-07-22 RX ADMIN — THIAMINE HYDROCHLORIDE 500 MG: 100 INJECTION, SOLUTION INTRAMUSCULAR; INTRAVENOUS at 07:07

## 2020-07-22 RX ADMIN — RIFAXIMIN 550 MG: 550 TABLET ORAL at 08:07

## 2020-07-22 RX ADMIN — POTASSIUM & SODIUM PHOSPHATES POWDER PACK 280-160-250 MG 2 PACKET: 280-160-250 PACK at 11:07

## 2020-07-22 RX ADMIN — LACTULOSE 10 G: 20 SOLUTION ORAL at 07:07

## 2020-07-22 RX ADMIN — HEPARIN SODIUM 5000 UNITS: 5000 INJECTION INTRAVENOUS; SUBCUTANEOUS at 08:07

## 2020-07-22 RX ADMIN — POTASSIUM & SODIUM PHOSPHATES POWDER PACK 280-160-250 MG 2 PACKET: 280-160-250 PACK at 07:07

## 2020-07-22 RX ADMIN — FOLIC ACID 1 MG: 1 TABLET ORAL at 08:07

## 2020-07-22 RX ADMIN — METHADONE HYDROCHLORIDE 10 MG: 5 TABLET ORAL at 08:07

## 2020-07-22 RX ADMIN — THIAMINE HYDROCHLORIDE 500 MG: 100 INJECTION, SOLUTION INTRAMUSCULAR; INTRAVENOUS at 08:07

## 2020-07-22 RX ADMIN — LACTULOSE 10 G: 20 SOLUTION ORAL at 08:07

## 2020-07-22 RX ADMIN — METHADONE HYDROCHLORIDE 10 MG: 5 TABLET ORAL at 07:07

## 2020-07-22 RX ADMIN — RIFAXIMIN 550 MG: 550 TABLET ORAL at 07:07

## 2020-07-22 RX ADMIN — POTASSIUM CHLORIDE 40 MEQ: 1500 TABLET, EXTENDED RELEASE ORAL at 05:07

## 2020-07-22 RX ADMIN — POTASSIUM CHLORIDE 40 MEQ: 1500 TABLET, EXTENDED RELEASE ORAL at 03:07

## 2020-07-22 RX ADMIN — POTASSIUM CHLORIDE 40 MEQ: 1500 TABLET, EXTENDED RELEASE ORAL at 11:07

## 2020-07-22 RX ADMIN — ZINC SULFATE 220 MG (50 MG) CAPSULE 220 MG: CAPSULE at 08:07

## 2020-07-22 RX ADMIN — HEPARIN SODIUM 5000 UNITS: 5000 INJECTION INTRAVENOUS; SUBCUTANEOUS at 07:07

## 2020-07-22 RX ADMIN — PANTOPRAZOLE SODIUM 40 MG: 40 TABLET, DELAYED RELEASE ORAL at 08:07

## 2020-07-22 RX ADMIN — THIAMINE HYDROCHLORIDE 500 MG: 100 INJECTION, SOLUTION INTRAMUSCULAR; INTRAVENOUS at 03:07

## 2020-07-22 NOTE — PLAN OF CARE
VSS  No signs of evident distress or complaint of pain  Pt. Up to chair with walker and one person assistance.  Pt. Worked with PT/OT  Mother at bedside  Indwelling urinary catheter draining craig urine.  See flowsheets for output  IV fluids discontinued  Pt. On a 1.2 L fluid restriction.  Pt. Aware.  Fecal incontinence pouch replaced.  Pt. With loose stool.  Plan for rehab facility.    Will continue to monitor.

## 2020-07-22 NOTE — SUBJECTIVE & OBJECTIVE
Interval History: Overall patient states he is feeling much better. No longer nauseous. Less tremulous and had an easier time feeding him during breakfast today. Urine output continues to be good.    Review of patient's allergies indicates:  No Known Allergies  Current Facility-Administered Medications   Medication Frequency    acetaminophen tablet 650 mg Q8H PRN    albuterol-ipratropium 2.5 mg-0.5 mg/3 mL nebulizer solution 3 mL Q6H PRN    dextrose 50% injection 12.5 g PRN    dextrose 50% injection 25 g PRN    folic acid tablet 1 mg Daily    glucagon (human recombinant) injection 1 mg PRN    glucose chewable tablet 16 g PRN    glucose chewable tablet 24 g PRN    heparin (porcine) injection 5,000 Units Q12H    lactulose 20 gram/30 mL solution Soln 10 g TID    melatonin tablet 6 mg Nightly PRN    methadone tablet 10 mg Q12H    ondansetron disintegrating tablet 8 mg Q8H PRN    pantoprazole EC tablet 40 mg Daily    potassium chloride SA CR tablet 40 mEq Q4H    potassium, sodium phosphates 280-160-250 mg packet 2 packet QID (AC & HS)    prochlorperazine injection Soln 5 mg Q6H PRN    rifAXIMin tablet 550 mg BID    sodium chloride 0.9% flush 10 mL PRN    thiamine (B-1) 500 mg in dextrose 5 % 50 mL IVPB TID    Followed by    [START ON 7/24/2020] thiamine (B-1) 250 mg in dextrose 5 % 50 mL IVPB Daily    zinc sulfate capsule 220 mg Daily       Objective:     Vital Signs (Most Recent):  Temp: 98.6 °F (37 °C) (07/22/20 1120)  Pulse: 71 (07/22/20 1100)  Resp: 14 (07/22/20 1100)  BP: (!) 168/85 (07/22/20 1100)  SpO2: 98 % (07/22/20 1100)  O2 Device (Oxygen Therapy): room air (07/22/20 0813) Vital Signs (24h Range):  Temp:  [97.8 °F (36.6 °C)-98.6 °F (37 °C)] 98.6 °F (37 °C)  Pulse:  [64-74] 71  Resp:  [14-22] 14  SpO2:  [95 %-98 %] 98 %  BP: (142-168)/(74-98) 168/85     Weight: 94 kg (207 lb 4.8 oz) (07/21/20 0400)  Body mass index is 31.52 kg/m².  Body surface area is 2.12 meters squared.    I/O last 3  completed shifts:  In: 4483.8 [P.O.:1200; I.V.:2733.8; IV Piggyback:550]  Out: 4876 [Urine:4200; Emesis/NG output:1; Stool:675]    Physical Exam  Vitals signs reviewed.   Constitutional:       General: He is not in acute distress.     Appearance: He is well-developed. He is not diaphoretic.   HENT:      Head: Normocephalic and atraumatic.      Mouth/Throat:      Mouth: Mucous membranes are dry.   Eyes:      General: Scleral icterus present.      Conjunctiva/sclera: Conjunctivae normal.   Neck:      Musculoskeletal: Normal range of motion.      Trachea: No tracheal deviation.      Comments: No JVD.  Cardiovascular:      Rate and Rhythm: Normal rate and regular rhythm.      Heart sounds: Normal heart sounds. No murmur.   Pulmonary:      Effort: Pulmonary effort is normal. No respiratory distress.      Breath sounds: Normal breath sounds. No wheezing or rales.   Abdominal:      General: Bowel sounds are normal. There is distension.      Palpations: Abdomen is soft.      Tenderness: There is no abdominal tenderness.   Musculoskeletal:         General: No deformity.      Comments: Trace pretibial pitting edema bilaterally.   Skin:     General: Skin is warm and dry.      Coloration: Skin is jaundiced.      Findings: No erythema or rash.      Comments: Venous stasis dermatitis in bilateral lower extremities.   Neurological:      Mental Status: He is alert.      Cranial Nerves: No cranial nerve deficit.      Sensory: No sensory deficit.      Motor: No abnormal muscle tone.      Comments: A&Ox4. Delayed responses to questions and at times but attention overall improved. Bilateral asterixis is still present, however amplitude continues to improve.         Significant Labs:  CMP:   Recent Labs   Lab 07/22/20  0632      CALCIUM 9.0   ALBUMIN 2.2*   PROT 6.1   *   K 3.0*   CO2 20*   *   BUN 49*   CREATININE 3.7*   ALKPHOS 146*   ALT 28   AST 65*   BILITOT 26.8*     Recent Labs   Lab 07/20/20  1241   COLORU  Chelsy   SPECGRAV 1.010   PHUR 6.0   PROTEINUA Negative   NITRITE Negative   LEUKOCYTESUR Negative     All labs within the past 24 hours have been reviewed.     Significant Imaging:  Labs: Reviewed  US: Reviewed

## 2020-07-22 NOTE — PLAN OF CARE
SW attempted to speak with patient's today regarding possible SNF placement if patient is unable to transfer back to [place]. SW unable to reach her at the phone number in chart. SW will attempt again tomorrow.     Chantelle Rae LMSW   - Case Management

## 2020-07-22 NOTE — PROGRESS NOTES
Progress Note   Hospital Medicine         Patient Name: Nicholas Aiken  MRN:  [id number]  [facility] Medicine Team: [facility] Jesus Thacker MD  Date of Admission:  7/20/2020     Length of Stay:  LOS: 1 day   Expected Discharge Date: 7/24/2020  Principal Problem:  Acute liver failure       Subjective:     Interval History/Overnight Events:  Patient is still confused, having some episodes of N/V and mild abdominal pain, likely a component of methadone withdrawals, started back on that today; Ab X ray shows no acute process; has had numerous BMs, if still confused in the AM, plan for IV thiamine and CT head; however it takes time for HE to resolve at times;   - Cr continues to improve; addiction psych consult;     Review of Systems   Constitutional: Negative for chills, fatigue, fever.   HENT: Negative for sore throat, trouble swallowing.    Eyes: Negative for photophobia, visual disturbance.   Respiratory: Negative for cough, shortness of breath.    Cardiovascular: Negative for chest pain, palpitations, leg swelling.   Gastrointestinal: positive for abdominal pain, constipation, positive diarrhea, nausea, vomiting.   Endocrine: Negative for cold intolerance, heat intolerance.   Genitourinary: Negative for dysuria, frequency.   Musculoskeletal: Negative for arthralgias, myalgias.   Skin: Negative for rash, wound, erythema   Neurological: Negative for dizziness, syncope, weakness, light-headedness.   Psychiatric/Behavioral: Negative for confusion, hallucinations, anxiety  All other systems reviewed and are negative.    Objective:     Temp:  [98 °F (36.7 °C)-99.1 °F (37.3 °C)]   Pulse:  [61-75]   Resp:  [16-25]   BP: (128-161)/(73-91)   SpO2:  [95 %-98 %]       Physical Exam:  Constitutional: appears weak and ill  Head: Normocephalic and atraumatic.   Mouth/Throat: Oropharynx is clear and moist.   Eyes: EOM are normal. Pupils are equal, round, and reactive to light. positive scleral icterus.   Neck: Normal range of  motion. Neck supple.   Cardiovascular: Normal rate and regular rhythm.  No murmur heard.  Pulmonary/Chest: Effort normal and breath sounds normal. No respiratory distress. No wheezes, rales, or rhonchi  Abdominal: Soft. Bowel sounds are normal.  positive distension and tenderness  Musculoskeletal: Normal range of motion. No edema.   Neurological: Alert and oriented to person, place, and time.   Skin: Skin is warm and dry.   Psychiatric: Normal mood and affect. Behavior is normal.     Recent Labs   Lab 07/20/20  0442 07/21/20  0645   WBC 10.76 10.29   HGB 12.3* 11.0*   HCT 36.3* 32.7*   * 100*     Recent Labs   Lab 07/20/20  0146 07/20/20  0953 07/20/20  1600 07/21/20  0645    142 141 147*   K 2.8*  2.9* 3.0* 3.4* 2.8*   * 118* 117* 117*   CO2 11* 12* 12* 18*   BUN 77* 72* 69* 61*   CREATININE 5.0* 4.7* 4.6* 4.4*   GLU 78 70 82 89   CALCIUM 8.4* 8.7 8.8 9.2   MG 1.8  --   --  1.5*   PHOS 2.9 2.8  --  1.6*     Recent Labs   Lab 07/20/20  0146 07/20/20  0953 07/20/20  1600 07/21/20  0645   ALKPHOS 181*  --  164* 146*   ALT 34  --  33 28   AST 91*  --  87* 68*   ALBUMIN 1.8* 1.8* 2.4* 2.5*   PROT 6.3  --  6.4 6.2   BILITOT 33.8*  --  33.3* 31.4*   INR 1.3*  --   --  1.3*     No results for input(s): POCTGLUCOSE in the last 168 hours.     folic acid  1 mg Oral Daily    heparin (porcine)  5,000 Units Subcutaneous Q12H    lactulose  10 g Oral TID    methadone  10 mg Oral Q12H    pantoprazole  40 mg Oral Daily    rifAXIMin  550 mg Oral BID    thiamine  100 mg Oral Daily    zinc sulfate  220 mg Oral Daily       Assessment and Plan     Mr. Nicholas Aiken is a 64 y.o. male who presented to [facility] on 7/20/2020 with     Hospital Course:    Mr. Nicholas Aiken was admitted to Hospital Medicine for management of     Active Hospital Problems    Diagnosis  POA    *Acute liver failure [K72.00]  Yes    Moderate malnutrition [E44.0]  Yes     Recommendations     1. Continue low sodium diet with Novasource  ONS.  Goals: 1. Pt's intake meals >75% by RD follow up.  Nutrition Goal Status: new  Communication of RD Recs: (POC)    Assessment and Plan     Nutrition Problem:  Moderate Protein-Calorie Malnutrition  Malnutrition in the context of Chronic Illness     Related to (etiology):  Alcoholism, liver failure     Signs and Symptoms (as evidenced by):  Energy Intake: <50% of estimated energy requirement for 1+ months  Body Fat Depletion: moderate depletion of orbitals, triceps   Muscle Mass Depletion: mild to moderate depletion of temples, clavicles, shoulders, hands, LEs  Weight Loss: predicted 6% x several weeks   Fluid accumulation: moderate ascites, mild edema to arms     Interventions(treatment strategy):  Collaboration of care with providers  Commercial beverage: Novasource ONS TID     Nutrition Diagnosis Status:  New      Encounter for pre-transplant evaluation for liver transplant [Z01.818]  Not Applicable    Alcohol use disorder, severe, in controlled environment [F10.20]  Yes    ATN (acute tubular necrosis) [N17.0]  Yes    Thrombocytopenia [D69.6]  Yes    Alcoholic hepatitis without ascites [K70.10]  Yes    Metabolic acidemia [E87.2]  Yes    Encephalopathy, metabolic [G93.41]  Yes    Narcotic abuse [F11.10]  Yes    KELLI (acute kidney injury) [N17.9]  Yes    Metabolic acidosis [E87.2]  Yes    Acute renal failure [N17.9]  Yes    Macrocytic anemia [D53.9]  Yes    Tobacco abuse [Z72.0]  Yes    Dyslipidemia [E78.5]  Yes    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     # Acute liver failure without coma   # Alcohol hepatitis without ascites  # Decompensated Hep C / Alcohol cirrhosis without ascites  # Severe alcohol dependence  MELD-Na score: 36 at 7/21/2020  6:45  AM  MELD score: 36 at 7/21/2020  6:45 AM  Calculated from:  Serum Creatinine: 4.4 mg/dL (Rounded to 4 mg/dL) at 7/21/2020  6:45 AM  Serum Sodium: 147 mmol/L (Rounded to 137 mmol/L) at 7/21/2020  6:45 AM  Total Bilirubin: 31.4 mg/dL at 7/21/2020  6:45 AM  INR(ratio): 1.3 at 7/21/2020  6:45 AM  Age: 64 years 11 months  - hepatology consulted  - PETH pending  - drinking in the past 2 weeks  - addiction psych consulted, states high risk  - U/S liver with doppler reviewed, no ascites  - transplant social work evaluation  - thiamine and folic acid     # KELLI  # ATN  # Metabolic acidosis   - Cr improving  - urine lytes and renal U/S reviewed  - nephrology following  - bicarb drip    # Hypernatremia  - NS at 75 cc/hr    # Acute hepatic encephalopathy   - cont lactulose, rifaximin and zinc    # Macrocytic anemia  # Thrombocytopenia  - folic acid;   - iron panel    # Moderate protein asael malnutrition  - PAB, boost, dietary       Diet:  Low sodium  GI PPx:    DVT PPx:    Goals of Care:  full    High Risk Conditions:  Acute liver failure     Disposition:  Later this week    Jesus Thacker MD  Medical Director [facility] Medicine  Spectra:  [phone]  Pager: [phone]

## 2020-07-22 NOTE — PT/OT/SLP PROGRESS
"Physical Therapy Treatment    Patient Name:  Nicholas Aiken   MRN:  [id number]    Recommendations:     Discharge Recommendations:  home health PT   Discharge Equipment Recommendations: none   Barriers to discharge: Decreased caregiver support for current functional level     Assessment:     Nicholas Aiken is a 64 y.o. male admitted with a medical diagnosis of Acute liver failure.  He presents with the following impairments/functional limitations:  weakness, impaired endurance, impaired self care skills, impaired functional mobilty, gait instability, impaired balance, decreased safety awareness, decreased lower extremity function. Patient was alert and oriented and tolerated treatment well. Patient's functional level is improving and achieved previously set goals. Goals were updated and new goals added. Updating information regarding home environment per conversation today: Patient lives in 2 story home but lives only on first floor. One step at entrance. Equipment used: rollator    Rehab Prognosis: Good; patient would benefit from acute skilled PT services to address these deficits and reach maximum level of function.    Recent Surgery: * No surgery found *      Plan:     During this hospitalization, patient to be seen 4 x/week to address the identified rehab impairments via gait training, therapeutic activities, therapeutic exercises, neuromuscular re-education and progress toward the following goals:    · Plan of Care Expires:  08/20/20    Subjective     Chief Complaint:"Can't stop pooping"  Patient/Family Comments/goals: Get home and have bowels under control   Pain/Comfort:  · Pain Rating 1: 5/10(Patient complained of low back pain from herniated dics)  · Pain Rating Post-Intervention 2: 0/10      Objective:     Communicated with mother prior to session.  Patient found up in chair with issa catheter, peripheral IV(fecal incontinence pouch) upon PT entry to room.     General Precautions: Standard, fall "   Orthopedic Precautions:N/A   Braces: N/A     Functional Mobility:  · Bed Mobility:     · Rolling Left:  independence  · Scooting: independence  · Supine to Sit: independence  · Sit to Supine: independence  · Transfers:     · Sit to Stand:  contact guard assistance with rolling walker. Patient required verbal cues to push up from chair using armrests instead of UEs on walker. Contact guard assistance from EOB to walker.  · Gait: Ambulated 35ft with rolling walker with contact guard assistance and verbal cues colette stay inside of the walker while turning.      AM-PAC 6 CLICK MOBILITY  Turning over in bed (including adjusting bedclothes, sheets and blankets)?: 3  Sitting down on and standing up from a chair with arms (e.g., wheelchair, bedside commode, etc.): 3  Moving from lying on back to sitting on the side of the bed?: 3  Moving to and from a bed to a chair (including a wheelchair)?: 3  Need to walk in hospital room?: 3  Climbing 3-5 steps with a railing?: 2  Basic Mobility Total Score: 17       Therapeutic Activities and Exercises:   Patient was instructed in role of physical therapy and plan of care. Patient received instruction in proper positioning using walker for improving safety awareness and fall prevention and verbally expressed understanding.    Patient left up in chair with all lines intact, call button in reach, RN notified and mother present..    GOALS:   Multidisciplinary Problems     Physical Therapy Goals        Problem: Physical Therapy Goal    Goal Priority Disciplines Outcome Goal Variances Interventions   Physical Therapy Goal     PT, PT/OT Ongoing, Progressing     Description: Goals to be met by: 2020     Patient will increase functional independence with mobility by performin. Supine to sit with MInimal Assistance  2. Sit to supine with MInimal Assistance  3. Rolling to Left and Right with Modified [place].                     Time Tracking:     PT Received On: 20  PT  Start Time: 1250     PT Stop Time: 1313  PT Total Time (min): 23 min     Billable Minutes: Gait Training 10 and Therapeutic Activity 13    Treatment Type: Treatment  PT/PTA: PT     PTA Visit Number: 0     Rhonda Mares, PT  07/22/2020

## 2020-07-22 NOTE — PROGRESS NOTES
[facility]  Nephrology  Progress Note    Patient Name: Nicholas Aiken  MRN: [id number]  Admission Date: 7/20/2020  Hospital Length of Stay: 2 days  Attending Provider: Jesus Thacker MD   Primary Care Physician: Primary Doctor No  Principal Problem:Acute liver failure    Subjective:     HPI: Nicholas Aiken is a 64 year old man with alcoholic and HCV cirrhosis s/p Harvoni, alcohol abuse (last drink per mother 3 weeks ago), gastric varices, history of narcotic abuse on methadone, tobacco use, OA, HTN, anxiety, and hypogonadism who presented as a transfer from the [facility] ED in [place], [place] for diarrhea, worsening hepatic encephalopathy, jaundice, and KELLI. History is obtained mostly from chart review as patient is not a good historian. Patient does not have a history of CKD and Cr baseline is 0.9-1.2. On arrival to [facility] ED, labs were significant for BUN 94, Cr 7.18, bicarb 15, T bili 40, ammonia 212, K 2.2. U/S did not show significant ascites. He was given 2L IVF, lactulose, and rifaximin. Repeat Cr after fluids improved to 5.91. Continued to have multiple BMs and an episode of nonbloody emesis. MELD remained in the 30s and patient was transferred to [facility] for a higher level of care/hepatology. On [facility] he received another 1L IVF and was started on ceftriaxone for SBP prophylaxis and albumin 25 mg TID. Continued to hold home lisinopril, spironolactone, and propranolol due to KELLI and suspected hypovolemia. Bicarb continued to drop to 11. Nephrology was consulted for KELLI.     Interval History: Overall patient states he is feeling much better. No longer nauseous. Less tremulous and had an easier time feeding him during breakfast today. Urine output continues to be good.    Review of patient's allergies indicates:  No Known Allergies  Current Facility-Administered Medications   Medication Frequency    acetaminophen tablet 650 mg Q8H PRN    albuterol-ipratropium 2.5 mg-0.5 mg/3 mL nebulizer  solution 3 mL Q6H PRN    dextrose 50% injection 12.5 g PRN    dextrose 50% injection 25 g PRN    folic acid tablet 1 mg Daily    glucagon (human recombinant) injection 1 mg PRN    glucose chewable tablet 16 g PRN    glucose chewable tablet 24 g PRN    heparin (porcine) injection 5,000 Units Q12H    lactulose 20 gram/30 mL solution Soln 10 g TID    melatonin tablet 6 mg Nightly PRN    methadone tablet 10 mg Q12H    ondansetron disintegrating tablet 8 mg Q8H PRN    pantoprazole EC tablet 40 mg Daily    potassium chloride SA CR tablet 40 mEq Q4H    potassium, sodium phosphates 280-160-250 mg packet 2 packet QID (AC & HS)    prochlorperazine injection Soln 5 mg Q6H PRN    rifAXIMin tablet 550 mg BID    sodium chloride 0.9% flush 10 mL PRN    thiamine (B-1) 500 mg in dextrose 5 % 50 mL IVPB TID    Followed by    [START ON 7/24/2020] thiamine (B-1) 250 mg in dextrose 5 % 50 mL IVPB Daily    zinc sulfate capsule 220 mg Daily       Objective:     Vital Signs (Most Recent):  Temp: 98.6 °F (37 °C) (07/22/20 1120)  Pulse: 71 (07/22/20 1100)  Resp: 14 (07/22/20 1100)  BP: (!) 168/85 (07/22/20 1100)  SpO2: 98 % (07/22/20 1100)  O2 Device (Oxygen Therapy): room air (07/22/20 0813) Vital Signs (24h Range):  Temp:  [97.8 °F (36.6 °C)-98.6 °F (37 °C)] 98.6 °F (37 °C)  Pulse:  [64-74] 71  Resp:  [14-22] 14  SpO2:  [95 %-98 %] 98 %  BP: (142-168)/(74-98) 168/85     Weight: 94 kg (207 lb 4.8 oz) (07/21/20 0400)  Body mass index is 31.52 kg/m².  Body surface area is 2.12 meters squared.    I/O last 3 completed shifts:  In: 4483.8 [P.O.:1200; I.V.:2733.8; IV Piggyback:550]  Out: 4876 [Urine:4200; Emesis/NG output:1; Stool:675]    Physical Exam  Vitals signs reviewed.   Constitutional:       General: He is not in acute distress.     Appearance: He is well-developed. He is not diaphoretic.   HENT:      Head: Normocephalic and atraumatic.      Mouth/Throat:      Mouth: Mucous membranes are dry.   Eyes:      General:  Scleral icterus present.      Conjunctiva/sclera: Conjunctivae normal.   Neck:      Musculoskeletal: Normal range of motion.      Trachea: No tracheal deviation.      Comments: No JVD.  Cardiovascular:      Rate and Rhythm: Normal rate and regular rhythm.      Heart sounds: Normal heart sounds. No murmur.   Pulmonary:      Effort: Pulmonary effort is normal. No respiratory distress.      Breath sounds: Normal breath sounds. No wheezing or rales.   Abdominal:      General: Bowel sounds are normal. There is distension.      Palpations: Abdomen is soft.      Tenderness: There is no abdominal tenderness.   Musculoskeletal:         General: No deformity.      Comments: Trace pretibial pitting edema bilaterally.   Skin:     General: Skin is warm and dry.      Coloration: Skin is jaundiced.      Findings: No erythema or rash.      Comments: Venous stasis dermatitis in bilateral lower extremities.   Neurological:      Mental Status: He is alert.      Cranial Nerves: No cranial nerve deficit.      Sensory: No sensory deficit.      Motor: No abnormal muscle tone.      Comments: A&Ox4. Delayed responses to questions and at times but attention overall improved. Bilateral asterixis is still present, however amplitude continues to improve.         Significant Labs:  CMP:   Recent Labs   Lab 07/22/20  0632      CALCIUM 9.0   ALBUMIN 2.2*   PROT 6.1   *   K 3.0*   CO2 20*   *   BUN 49*   CREATININE 3.7*   ALKPHOS 146*   ALT 28   AST 65*   BILITOT 26.8*     Recent Labs   Lab 07/20/20  1241   COLORU [place]   SPECGRAV 1.010   PHUR 6.0   PROTEINUA Negative   NITRITE Negative   LEUKOCYTESUR Negative     All labs within the past 24 hours have been reviewed.     Significant Imaging:  Labs: Reviewed  US: Reviewed    Assessment/Plan:     KELLI (acute kidney injury)  Metabolic acidosis  Hypokalemia  Hypophosphatemia  Hypernatremia  This patient presented with hepatic encephalopathy and 4 days of diarrhea. Also presumably poor  oral intake at home considering current debility while inpatient, lives at home alone. UA bland aside from high bilirubin. Has been hemodynamically stable (hypertensive) and renal function improving with IV fluids, making HRS very unlikely. This all points towards a pre-renal etiology. Continued to be acidotic with bicarb 11, pH 7.31. Retroperitoneal U/S WNL. Patient has multiple reasons to have an anion gap (corrected 20.5) metabolic acidosis. Uremia from renal dysfunction and lactic acid accumulation from liver dysfunction can contribute to the gap. Acidosis is further worsened from GI losses. After 1 day of IV bicarb, then D5/1/2NS with KCL 40 mEq and sodium bicarb 150 mEq, patient's acidosis has improved with bicarb 18.    - On 7/21 we stopped D5/1/2NS with KCL 40 mEq and sodium bicarb 150 mEq and replaced with NS due to new hypernatremia with Na 147, however the next day sodium was unchanged. Free water deficit is 2.4L ans still on fluid restriction of 1.2L per day. We would recommend liberalizing the fluid restriction. If you are stopping the NS, would recommend encouraging aggressive PO hydration or re-starting the NS until the sodium normalizes.  - Strict ins and outs and daily weights  - Urine microscopy on 7/21 showed scant hyaline casts but overall bland. This along with his rapid improvement in renal function with fluids is consistent with a prerenal etiology, not ATN.   - Would recommend decreasing lactulose dose and titrate to 3-4 BMs per day. Patient is having 7-8 BMs per day and this will delay the improvement of his prerenal KELLI, acidosis, and other electrolyte abnormalities.  - The patient does not have any current indications for urgent HD, especially now that acidosis has improved  - Avoid nephrotoxic agents  - Renally dose medications to current CrCl        Thank you for your consult. I will follow-up with patient. Please contact us if you have any additional questions.    Robbi Borges    Nephrology  [facility]

## 2020-07-22 NOTE — PLAN OF CARE
Problem: Physical Therapy Goal  Goal: Physical Therapy Goal  Description: Goals to be met by: 2020     Patient will increase functional independence with mobility by performin. Supine to sit with MInimal Assistance-met 20  2. Sit to supine with MInimal Assistance-met 20  3. Rolling to Left and Right with Modified [place].- met 20  4. Sit to stand with supervision  5. Ambulate with rolling walker with SBA 100ft    Continued evaluation and added goals # 4 and 5. Patient's mental status improved today.     Outcome: Ongoing, Progressing

## 2020-07-22 NOTE — PROGRESS NOTES
[facility]  Hepatology  Consult/progress note    Patient Name: Nicholas Aiken  MRN: [id number]  Admission Date: 7/20/2020  Hospital Length of Stay: 2 days  Code Status: Full Code   Attending Provider: Jesus Thacker MD   Consulting Provider: Deidre Al MD  Primary Care Physician: Primary Doctor No  Principal Problem:Acute liver failure      Subjective:     HPI: Nicholas Aiken is a 64 y.o. male with history of HCV/EtOH cirrhosis s/p Harvoni tx w/ SVR, chronic pain on methadone, who is admitted as a transfer from [facility] for AMS and KELLI.   Upon arrival, labs were significant for hypokalemia (2.8), improving KELLI with Cr 4.7, Tbili 33.8 (improving from 40 at OSH). He was confused. Vital signs stable. Hepatology was consulted for decompensated liver cirrhosis. He was started on lactulose and rifaximin and his encephalopathy improved. Psych was consulted and although the patient was confused, he appeared to be high risk for alcohol abuse relapse and recommended IOP and alcohol rehab. Nephrology was consulted, and his KELLI is improving. The patient understands the severity of his liver disease and he expressed the willingness to quit drinking and enroll in rehab.     Social history:  Per the mother, the patient is currently unemployed, lives on his own, but she lives down the street from his house. He has an ex-wife and a son with whom he is not in contact. She reports that he was told that he needs to stop drinking alcohol because his doctor would stop his testosterone supplements if he doesn't and he has been reportedly sober for 3 months. He has a hx of alcohol use for years. Attempted AA once but was unsuccessful. No hx of DUIs.        Past Medical History:   Diagnosis Date    Anxiety     Hepatitis C     Hypertension     Testosterone deficiency        Past Surgical History:   Procedure Laterality Date    ear drum replaced      fisure      rgd         Family History   Problem  Relation Age of Onset    Arthritis Mother     Diabetes Father     Hypertension Father     Arthritis Father     Hyperlipidemia Father     Stroke Father        Social History     Socioeconomic History    Marital status: Single     Spouse name: Not on file    Number of children: Not on file    Years of education: Not on file    Highest education level: Not on file   Occupational History    Not on file   Social Needs    Financial resource strain: Not on file    Food insecurity     Worry: Not on file     Inability: Not on file    Transportation needs     Medical: Not on file     Non-medical: Not on file   Tobacco Use    Smoking status: Current Every Day Smoker     Types: Cigarettes    Smokeless tobacco: Never Used   Substance and Sexual Activity    Alcohol use: Yes    Drug use: No    Sexual activity: Not on file   Lifestyle    Physical activity     Days per week: Not on file     Minutes per session: Not on file    Stress: Not on file   Relationships    Social connections     Talks on phone: Not on file     Gets together: Not on file     Attends Mu-ism service: Not on file     Active member of club or organization: Not on file     Attends meetings of clubs or organizations: Not on file     Relationship status: Not on file   Other Topics Concern    Not on file   Social History Narrative    Not on file       No current facility-administered medications on file prior to encounter.      Current Outpatient Medications on File Prior to Encounter   Medication Sig Dispense Refill    baclofen (LIORESAL) 20 MG tablet Take 20 mg by mouth 3 (three) times daily. BACLOFEN 20 MG TABS      docusate sodium (COLACE) 100 MG capsule Take 200 mg by mouth as directed.       doxepin (SINEQUAN) 10 MG capsule Take 10 mg by mouth every evening.      fenofibrate 160 MG Tab TAKE 1 TABLET EVERY DAY 90 tablet 1    hydrocodone-acetaminophen 10-325mg (NORCO)  mg Tab HYDROCODONE-ACETAMINOPHEN  MG TABS       lisinopril (PRINIVIL,ZESTRIL) 40 MG tablet TAKE 1 TABLET EVERY DAY 90 tablet 1    melatonin 3 mg Tab Take 3 mg by mouth once daily. [facility] MELATONIN 3 MG TABS      methadone (DOLOPHINE) 10 MG tablet Take 10 mg by mouth 2 (two) times a day. METHADONE HCL 10 MG TABS      niacin (NIASPAN) 750 mg TbSR Take 750 mg by mouth as directed.       pantoprazole (PROTONIX) 40 MG tablet Take 1 tablet (40 mg total) by mouth once daily. 30 tablet 0    potassium 99 mg Tab Take 99 mg by mouth once daily.      propranolol (INDERAL) 20 MG tablet TAKE 1 TABLET EVERY 8 HOURS AS DIRECTED  270 tablet 1    propranolol (INDERAL) 20 MG tablet TAKE 1 TABLET EVERY 8 HOURS AS DIRECTED 270 tablet 0    propranolol (INDERAL) 20 MG tablet TAKE 1 TABLET EVERY 8 HOURS AS DIRECTED 270 tablet 0    spironolactone (ALDACTONE) 50 MG tablet TAKE 1 TABLET EVERY DAY 90 tablet 1    testosterone cypionate (DEPOTESTOTERONE CYPIONATE) 200 mg/mL injection Inject into the muscle every 14 (fourteen) days. TESTOSTERONE CYPIONATE 200 MG/ML SOLN         Review of patient's allergies indicates:  No Known Allergies        Objective:     Vitals:    07/22/20 1120   BP:    Pulse:    Resp:    Temp: 98.6 °F (37 °C)       Constitutional:  not in acute distress and ill appearing, but non-toxic  HENT: Head: Normal, normocephalic, atraumatic.  Eyes: conjunctiva clear and sclera icteric  Cardiovascular: regular rate and rhythm and no murmur  Respiratory: normal chest expansion & respiratory effort   and no accessory muscle use  GI: soft, tender in suprapubic area, without masses or organomegaly  Musculoskeletal: no muscular tenderness noted  Skin: --- POSITIVES: jaundice  Neurological: alert, oriented x3, asterixis present  Psychiatric: mood and affect are within normal limits, slow to answer but appears to be coherent and understands everything        Significant Labs:  Recent Labs   Lab 07/20/20  0442 07/21/20  0645 07/22/20  0632   HGB 12.3* 11.0* 10.9*       Lab Results    Component Value Date    WBC 12.99 (H) 07/22/2020    HGB 10.9 (L) 07/22/2020    HCT 32.9 (L) 07/22/2020    MCV 96 07/22/2020     (L) 07/22/2020       Lab Results   Component Value Date     (H) 07/22/2020    K 3.0 (L) 07/22/2020     (H) 07/22/2020    CO2 20 (L) 07/22/2020    BUN 49 (H) 07/22/2020    CREATININE 3.7 (H) 07/22/2020    CALCIUM 9.0 07/22/2020    ANIONGAP 9 07/22/2020    ESTGFRAFRICA 19.0 (A) 07/22/2020    EGFRNONAA 16.4 (A) 07/22/2020       Lab Results   Component Value Date    ALT 28 07/22/2020    AST 65 (H) 07/22/2020    ALKPHOS 146 (H) 07/22/2020    BILITOT 26.8 (H) 07/22/2020       Lab Results   Component Value Date    INR 1.2 07/22/2020    INR 1.3 (H) 07/21/2020    INR 1.3 (H) 07/20/2020           Significant Imaging:  Reviewed pertinent radiology findings.       Assessment/Plan:       MELD-Na score: 33 at 7/22/2020  6:32 AM  MELD score: 33 at 7/22/2020  6:32 AM  Calculated from:  Serum Creatinine: 3.7 mg/dL at 7/22/2020  6:32 AM  Serum Sodium: 147 mmol/L (Rounded to 137 mmol/L) at 7/22/2020  6:32 AM  Total Bilirubin: 26.8 mg/dL at 7/22/2020  6:32 AM  INR(ratio): 1.2 at 7/22/2020  6:32 AM  Age: 64 years 11 months    Problem List:    1. Decompensated EtOH/HCV liver cirrhosis     Presumed etiology:  EtOH/HCV   MELD 36, Child Class C   Decompensated with HE. Decompensating etiology is likely ongoing EtOH.  - Portal hypertension: as evidenced by thrombocytopenia, hypersplenism, BLE edema, varices  - Ascites: none seen on U/S.   - Esophageal varices: last EGD 2016 with gastric varices  - Hepatic encephalopathy: lactulose titrated to 3-4 BMs daily, rifaximin  - HCC screening: No evidence of masses on U/S  - Vaccination status: check HAV total Ab, and HbsAb if neg, will need HAV/HBV vaccine outpatient.   - Transplant: needs alcohol rehab and proof of ability to maintain sobriety     2. KELLI  Patient's Cr continues to improve, HRS unlikely        Plan:  1. Continue treatment with lactulose  and rifaximin for HE. Titrate to 3-4 BMs a day. Patient might not be able to afford rifaximin outpatient, check copay. If unable, it would be reasonable to stop rifaximin and trial lactulose only.  2. PETH pending  3. Outpatient alcohol rehab   4. Patient will need hepatitis A and B vaccinations. Can be given first dose inpatient, to follow up with booster outpatient.  5. HCV PCR pending  6. Obtain autoimmune panel (ordered)    Please obtain daily CBC, BMP, LFT, INR  Thank you for involving us in the care of Nicholas Aiken. Please call with any additional questions, concerns or changes in the patient's clinical status.    Deidre Al MD  Gastroenterology Fellow PGY IV   [facility]

## 2020-07-22 NOTE — PLAN OF CARE
Pt continues to be confused.  lactulose continued despite multiple BM.  Rectal tubes x2 out with inflated tip.  Fecal incontinence pouch placed and has been successful in containing BMs.  Bed in  Low and locked position.  Waffle mattress in use.  WC consulted for buttocks.  NS @ 75cc/hr.  Psych consulted.  Methadone restarted today.  Mag/Kcl/Kphose replaced.   Gonzalez draining dark craig urine.  visi in use.  See flowsheet for full assessment and details.

## 2020-07-22 NOTE — PT/OT/SLP PROGRESS
Occupational Therapy   Treatment    Name: Nicholas Aiken  MRN: [id number]  Admitting Diagnosis:  Acute liver failure       Recommendations:     Discharge Recommendations: nursing facility, skilled  Discharge Equipment Recommendations:  walker, rolling, shower chair, bedside commode  Barriers to discharge:  Decreased caregiver support    Assessment:     Nicholas Aiken is a 64 y.o. male with a medical diagnosis of Acute liver failure. Pt much more alert and engaged this session. Mother present to assist with getting an accurate history (See below). Pt able to t/f from bed>chair with Min A - still presents with generalized weakness and poor endurance. Performance deficits affecting function are weakness, impaired endurance, impaired self care skills, impaired functional mobilty, gait instability, impaired balance, decreased coordination, decreased safety awareness.     Rehab Prognosis:  Good; patient would benefit from acute skilled OT services to address these deficits and reach maximum level of function.       Plan:     Patient to be seen 4 x/week to address the above listed problems via self-care/home management, therapeutic activities, therapeutic exercises  · Plan of Care Expires: 08/20/20  · Plan of Care Reviewed with: patient, mother    Subjective     Pain/Comfort:  · Pain Rating 1: 0/10    Objective:     History and PLOF:  Pt lives in a 2SH but has a B/B on the 1st floor. He was totally (I) with ADLs/IADLs and only has SPC which he doesn't typically use. Mother lives nearby but can only provide limited assistance.  Cares for a dog, bird and fish and likes to cook.    Communicated with: rn prior to session.  Patient found supine with issa catheter, bowel management system upon OT entry to room.    General Precautions: Standard, fall     Occupational Performance:     Bed Mobility:    · Patient completed Rolling/Turning to Left with  stand by assistance  · Patient completed Scooting/Bridging with contact guard  assistance  · Patient completed Supine to Sit with contact guard assistance     Functional Mobility/Transfers:  · Patient completed Sit <> Stand Transfer with contact guard assistance  with  rolling walker   · Patient completed Bed <> Chair Transfer using Step Transfer technique with minimum assistance with rolling walker  · Functional Mobility: Walked ~ 3' to chair with CGA using a RW.    Activities of Daily Living:  · Feeding:  independence   · Grooming: supervision seated    [facility] 6 Click ADL: 18    Treatment & Education:  From chair level, pt completed BUE therex (x 20 reps each) including:  - bicep curls  - resistive tricep extension  - straight arm raises  - Discussed OT POC/progress and benefits of OOB sitting.    Patient left up in chair with all lines intact and call button in reachEducation:      GOALS:   Multidisciplinary Problems     Occupational Therapy Goals        Problem: Occupational Therapy Goal    Goal Priority Disciplines Outcome Interventions   Occupational Therapy Goal     OT, PT/OT Ongoing, Progressing    Description: Goals to be met by: 7/28/20     Patient will increase functional independence with ADLs by performing:    UE Dressing with Stand-by Assistance.  LE Dressing with Moderate Assistance.  Grooming while EOB with Stand-by Assistance.  Toileting from bedside commode with Minimal Assistance for hygiene and clothing management.   Rolling to Bilateral with Supervision.   Supine to sit with Minimal Assistance. MET 7/22  REVISED to (S).  Toilet transfer to bedside commode with Minimal Assistance.                     Time Tracking:     OT Date of Treatment: 07/22/20  OT Start Time: 1140  OT Stop Time: 1207  OT Total Time (min): 27 min    Billable Minutes:Therapeutic Activity 14  Therapeutic Exercise 13    BRIAN Diaz  7/22/2020

## 2020-07-23 VITALS
DIASTOLIC BLOOD PRESSURE: 81 MMHG | SYSTOLIC BLOOD PRESSURE: 158 MMHG | HEIGHT: 68 IN | OXYGEN SATURATION: 96 % | BODY MASS INDEX: 31.42 KG/M2 | HEART RATE: 80 BPM | TEMPERATURE: 99 F | WEIGHT: 207.31 LBS | RESPIRATION RATE: 14 BRPM

## 2020-07-23 LAB
ALBUMIN SERPL BCP-MCNC: 2.2 G/DL (ref 3.5–5.2)
ALP SERPL-CCNC: 150 U/L (ref 55–135)
ALT SERPL W/O P-5'-P-CCNC: 31 U/L (ref 10–44)
ANION GAP SERPL CALC-SCNC: 9 MMOL/L (ref 8–16)
AST SERPL-CCNC: 66 U/L (ref 10–40)
BASOPHILS # BLD AUTO: 0.05 K/UL (ref 0–0.2)
BASOPHILS NFR BLD: 0.3 % (ref 0–1.9)
BILIRUB SERPL-MCNC: 26.2 MG/DL (ref 0.1–1)
BUN SERPL-MCNC: 43 MG/DL (ref 8–23)
CALCIUM SERPL-MCNC: 9 MG/DL (ref 8.7–10.5)
CHLORIDE SERPL-SCNC: 116 MMOL/L (ref 95–110)
CO2 SERPL-SCNC: 19 MMOL/L (ref 23–29)
CREAT SERPL-MCNC: 3.2 MG/DL (ref 0.5–1.4)
DIFFERENTIAL METHOD: ABNORMAL
EOSINOPHIL # BLD AUTO: 0.2 K/UL (ref 0–0.5)
EOSINOPHIL NFR BLD: 1 % (ref 0–8)
ERYTHROCYTE [DISTWIDTH] IN BLOOD BY AUTOMATED COUNT: 19.6 % (ref 11.5–14.5)
EST. GFR  (AFRICAN AMERICAN): 22.6 ML/MIN/1.73 M^2
EST. GFR  (NON AFRICAN AMERICAN): 19.5 ML/MIN/1.73 M^2
GLUCOSE SERPL-MCNC: 92 MG/DL (ref 70–110)
HCT VFR BLD AUTO: 33.9 % (ref 40–54)
HGB BLD-MCNC: 10.9 G/DL (ref 14–18)
IMM GRANULOCYTES # BLD AUTO: 0.37 K/UL (ref 0–0.04)
IMM GRANULOCYTES NFR BLD AUTO: 2.4 % (ref 0–0.5)
INR PPP: 1.3 (ref 0.8–1.2)
LYMPHOCYTES # BLD AUTO: 0.9 K/UL (ref 1–4.8)
LYMPHOCYTES NFR BLD: 5.6 % (ref 18–48)
MAGNESIUM SERPL-MCNC: 1.5 MG/DL (ref 1.6–2.6)
MCH RBC QN AUTO: 31.9 PG (ref 27–31)
MCHC RBC AUTO-ENTMCNC: 32.2 G/DL (ref 32–36)
MCV RBC AUTO: 99 FL (ref 82–98)
MONOCYTES # BLD AUTO: 1.1 K/UL (ref 0.3–1)
MONOCYTES NFR BLD: 7.3 % (ref 4–15)
NEUTROPHILS # BLD AUTO: 13 K/UL (ref 1.8–7.7)
NEUTROPHILS NFR BLD: 83.4 % (ref 38–73)
NRBC BLD-RTO: 1 /100 WBC
PHOSPHATE SERPL-MCNC: 2.4 MG/DL (ref 2.7–4.5)
PLATELET # BLD AUTO: 103 K/UL (ref 150–350)
PMV BLD AUTO: ABNORMAL FL (ref 9.2–12.9)
POTASSIUM SERPL-SCNC: 3.5 MMOL/L (ref 3.5–5.1)
PROT SERPL-MCNC: 6.1 G/DL (ref 6–8.4)
PROTHROMBIN TIME: 14.3 SEC (ref 9–12.5)
RBC # BLD AUTO: 3.42 M/UL (ref 4.6–6.2)
SODIUM SERPL-SCNC: 144 MMOL/L (ref 136–145)
WBC # BLD AUTO: 15.62 K/UL (ref 3.9–12.7)

## 2020-07-23 PROCEDURE — 83735 ASSAY OF MAGNESIUM: CPT

## 2020-07-23 PROCEDURE — 86256 FLUORESCENT ANTIBODY TITER: CPT | Mod: 91

## 2020-07-23 PROCEDURE — 25000003 PHARM REV CODE 250: Performed by: HOSPITALIST

## 2020-07-23 PROCEDURE — 85610 PROTHROMBIN TIME: CPT

## 2020-07-23 PROCEDURE — 84100 ASSAY OF PHOSPHORUS: CPT

## 2020-07-23 PROCEDURE — 80053 COMPREHEN METABOLIC PANEL: CPT

## 2020-07-23 PROCEDURE — 63600175 PHARM REV CODE 636 W HCPCS: Performed by: HOSPITALIST

## 2020-07-23 PROCEDURE — 36415 COLL VENOUS BLD VENIPUNCTURE: CPT

## 2020-07-23 PROCEDURE — 85025 COMPLETE CBC W/AUTO DIFF WBC: CPT

## 2020-07-23 PROCEDURE — 99239 HOSP IP/OBS DSCHRG MGMT >30: CPT | Mod: ,,, | Performed by: HOSPITALIST

## 2020-07-23 PROCEDURE — 86235 NUCLEAR ANTIGEN ANTIBODY: CPT

## 2020-07-23 PROCEDURE — 25000003 PHARM REV CODE 250: Performed by: INTERNAL MEDICINE

## 2020-07-23 PROCEDURE — 86235 NUCLEAR ANTIGEN ANTIBODY: CPT | Mod: 59

## 2020-07-23 PROCEDURE — 86039 ANTINUCLEAR ANTIBODIES (ANA): CPT

## 2020-07-23 PROCEDURE — 86038 ANTINUCLEAR ANTIBODIES: CPT

## 2020-07-23 PROCEDURE — 99239 PR HOSPITAL DISCHARGE DAY,>30 MIN: ICD-10-PCS | Mod: ,,, | Performed by: HOSPITALIST

## 2020-07-23 RX ORDER — FOLIC ACID 1 MG/1
1 TABLET ORAL DAILY
Refills: 0
Start: 2020-07-24 | End: 2021-07-24

## 2020-07-23 RX ORDER — ZINC SULFATE 50(220)MG
220 CAPSULE ORAL DAILY
Start: 2020-07-24

## 2020-07-23 RX ORDER — LACTULOSE 10 G/15ML
10 SOLUTION ORAL 3 TIMES DAILY
Qty: 450 ML | Refills: 0
Start: 2020-07-23

## 2020-07-23 RX ADMIN — PANTOPRAZOLE SODIUM 40 MG: 40 TABLET, DELAYED RELEASE ORAL at 09:07

## 2020-07-23 RX ADMIN — THIAMINE HYDROCHLORIDE 500 MG: 100 INJECTION, SOLUTION INTRAMUSCULAR; INTRAVENOUS at 09:07

## 2020-07-23 RX ADMIN — HEPARIN SODIUM 5000 UNITS: 5000 INJECTION INTRAVENOUS; SUBCUTANEOUS at 09:07

## 2020-07-23 RX ADMIN — METHADONE HYDROCHLORIDE 10 MG: 5 TABLET ORAL at 09:07

## 2020-07-23 RX ADMIN — RIFAXIMIN 550 MG: 550 TABLET ORAL at 09:07

## 2020-07-23 RX ADMIN — POTASSIUM & SODIUM PHOSPHATES POWDER PACK 280-160-250 MG 2 PACKET: 280-160-250 PACK at 05:07

## 2020-07-23 RX ADMIN — FOLIC ACID 1 MG: 1 TABLET ORAL at 09:07

## 2020-07-23 RX ADMIN — ZINC SULFATE 220 MG (50 MG) CAPSULE 220 MG: CAPSULE at 09:07

## 2020-07-23 RX ADMIN — LACTULOSE 10 G: 20 SOLUTION ORAL at 09:07

## 2020-07-23 NOTE — SUBJECTIVE & OBJECTIVE
Interval History: Feeling good today. Worked with PT yesterday and spent a large portion of the day in the chair which he liked. He does admit to being thirsty with the fluid restriction and states that he does not restrict fluids at home. Good UOP with 3L urine yesterday.    Review of patient's allergies indicates:  No Known Allergies  Current Facility-Administered Medications   Medication Frequency    acetaminophen tablet 650 mg Q8H PRN    albuterol-ipratropium 2.5 mg-0.5 mg/3 mL nebulizer solution 3 mL Q6H PRN    dextrose 50% injection 12.5 g PRN    dextrose 50% injection 25 g PRN    folic acid tablet 1 mg Daily    glucagon (human recombinant) injection 1 mg PRN    glucose chewable tablet 16 g PRN    glucose chewable tablet 24 g PRN    heparin (porcine) injection 5,000 Units Q12H    lactulose 20 gram/30 mL solution Soln 10 g TID    melatonin tablet 6 mg Nightly PRN    methadone tablet 10 mg Q12H    ondansetron disintegrating tablet 8 mg Q8H PRN    pantoprazole EC tablet 40 mg Daily    prochlorperazine injection Soln 5 mg Q6H PRN    rifAXIMin tablet 550 mg BID    sodium chloride 0.9% flush 10 mL PRN    thiamine (B-1) 500 mg in dextrose 5 % 50 mL IVPB TID    Followed by    [START ON 7/24/2020] thiamine (B-1) 250 mg in dextrose 5 % 50 mL IVPB Daily    zinc sulfate capsule 220 mg Daily       Objective:     Vital Signs (Most Recent):  Temp: 98.2 °F (36.8 °C) (07/23/20 0830)  Pulse: 88 (07/23/20 0830)  Resp: 18 (07/23/20 0900)  BP: (!) 158/68 (07/23/20 0830)  SpO2: 98 % (07/23/20 0830)  O2 Device (Oxygen Therapy): room air (07/22/20 2347) Vital Signs (24h Range):  Temp:  [98.2 °F (36.8 °C)-99.1 °F (37.3 °C)] 98.2 °F (36.8 °C)  Pulse:  [64-88] 88  Resp:  [7-25] 18  SpO2:  [97 %-98 %] 98 %  BP: (137-168)/(68-94) 158/68     Weight: 94 kg (207 lb 4.8 oz) (07/21/20 0400)  Body mass index is 31.52 kg/m².  Body surface area is 2.12 meters squared.    I/O last 3 completed shifts:  In: 2050 [P.O.:1200;  I.V.:750; IV Piggyback:100]  Out: 4950 [Urine:3750; Stool:1200]    Physical Exam  Vitals signs reviewed.   Constitutional:       General: He is not in acute distress.     Appearance: He is well-developed. He is not diaphoretic.   HENT:      Head: Normocephalic and atraumatic.      Mouth/Throat:      Mouth: Mucous membranes are dry.   Eyes:      General: Scleral icterus present.      Conjunctiva/sclera: Conjunctivae normal.   Neck:      Musculoskeletal: Normal range of motion.      Trachea: No tracheal deviation.      Comments: No JVD.  Cardiovascular:      Rate and Rhythm: Normal rate and regular rhythm.      Heart sounds: Normal heart sounds. No murmur.   Pulmonary:      Effort: Pulmonary effort is normal. No respiratory distress.      Breath sounds: Normal breath sounds. No wheezing or rales.   Abdominal:      General: Bowel sounds are normal. There is distension.      Palpations: Abdomen is soft.      Tenderness: There is no abdominal tenderness.   Musculoskeletal:         General: No deformity.      Comments: Trace pretibial pitting edema bilaterally.   Skin:     General: Skin is warm and dry.      Coloration: Skin is jaundiced.      Findings: No erythema or rash.      Comments: Venous stasis dermatitis in bilateral lower extremities.   Neurological:      Mental Status: He is alert.      Cranial Nerves: No cranial nerve deficit.      Sensory: No sensory deficit.      Motor: No abnormal muscle tone.      Comments: A&Ox4. Answering questions appropriately. Action tremor and bilateral asterixis still present, however amplitude continues to improve.         Significant Labs:  CMP:   Recent Labs   Lab 07/23/20  0643   GLU 92   CALCIUM 9.0   ALBUMIN 2.2*   PROT 6.1      K 3.5   CO2 19*   *   BUN 43*   CREATININE 3.2*   ALKPHOS 150*   ALT 31   AST 66*   BILITOT 26.2*     Recent Labs   Lab 07/20/20  1241   COLORU Chelsy   SPECGRAV 1.010   PHUR 6.0   PROTEINUA Negative   NITRITE Negative   LEUKOCYTESUR  Negative     All labs within the past 24 hours have been reviewed.     Significant Imaging:  Labs: Reviewed  US: Reviewed

## 2020-07-23 NOTE — DISCHARGE SUMMARY
"DISCHARGE SUMMARY  Hospital Medicine    [place] [facility] [place] [place]    Patient Name: Nicholas Aiken  YOB: 1955    Admit Date: 7/20/2020    Discharge Date: 07/23/2020    Discharge Attending Physician: Cee Mendez MD     Chief Complaint: Acute liver failure     Princilpal Diagnoses:  Active Hospital Problems    Diagnosis  POA    *Acute liver failure [K72.00]  Yes    Moderate malnutrition [E44.0]  Yes    Encounter for pre-transplant evaluation for liver transplant [Z01.818]  Not Applicable    Alcohol use disorder, severe, in controlled environment [F10.20]  Yes    ATN (acute tubular necrosis) [N17.0]  Yes    Thrombocytopenia [D69.6]  Yes    Alcoholic hepatitis without ascites [K70.10]  Yes    Metabolic acidemia [E87.2]  Yes    Encephalopathy, metabolic [G93.41]  Yes    Narcotic abuse [F11.10]  Yes    KELLI (acute kidney injury) [N17.9]  Yes    Metabolic acidosis [E87.2]  Yes    Acute renal failure [N17.9]  Yes    Macrocytic anemia [D53.9]  Yes    Tobacco abuse [Z72.0]  Yes    Dyslipidemia [E78.5]  Yes    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       Discharged Condition: Admit problems have stabilized     HOSPITAL COURSE:      Initial Presentation:    As per admitting provider,     64 y.o. male with Hep C, previous alcohol and narcotic abuse, cirrhosis 2/2 EtOH and Hep C s/p Harvoni, gastric varices admitted for hepatic encephalopathy and KELLI in setting of diarrhea. He is orient to self, but otherwise history unreliable from patient.      Per  note, " 64M with cirrhosis 2/2 EtOH and Hep C s/p Harvoni (GI: Dr Hernadez), prior EtOH dependence in remission (last drink 10/2019), last EGD 2016 with gastric varices, OA/chronic pain, h/o narcotic " "abuse on long term methadone 10 BID (per care Everywhere), tobacco smoking, admit to [facility] yesterday afternoon for decompensated cirrhosis and hepatic encephalopathy, and severe KELLI, in setting of diarrhea.       In ED: AMS/encephalopathy, worsening jaundice, Ammonia 212 (normal 19-60), TB 40, BUN 94/Cr 7.18 in patient with no significant renal Hx, K 2.2, INR 1.31, platelets 105, WBC 8.6, H/H ok. Abd U/S with doppler, exhibits normal hepatopedal flow and echogenic liver with nodular contour, no ascites, not enough fluid to tap (GI and radiology discussed). CXR wnl. UA wnl except for bili.  Got 2L IVFs bolus.  Started on lactulose TID, multiple BMs, repeated ammonia today and improved at 209.  GI consult added rifaximin today.  Labs unchanged today. K+ up to 2.8, last Cr improved by 2 points to 5.91. Nephrology and GI consulted.  Diarrhea - Stool Cx and C diff sent and pending.   It is uncertain if the KELLI is due to volume depletion due to profuse diarrhea or hepatorenal syndrome - however, he has already improved after crystalloid IVFs alone which may all be explained by volume depletion     Ongoing diarrhea today - 5 documented stools.  Asked about UOP, and he has had 4 urine episodes (not strict Is/Os).  Just had an episode of vomiting (nonbloody)  MELD-Na 36 (using some labs from today and some from yesterday which is not ideal albeit we know MELD is high given TB and Cr)"    Course of Principle Problem for Admission:    # Acute on chronic liver failure without coma   # Alcohol hepatitis without ascites  # Decompensated Hep C / Alcohol cirrhosis without ascites  # Severe alcohol dependence    MELD-Na score: 33 at 7/23/2020  6:43 AM  MELD score: 33 at 7/23/2020  6:43 AM  Calculated from:  Serum Creatinine: 3.2 mg/dL at 7/23/2020  6:43 AM  Serum Sodium: 144 mmol/L (Rounded to 137 mmol/L) at 7/23/2020  6:43 AM  Total Bilirubin: 26.2 mg/dL at 7/23/2020  6:43 AM  INR(ratio): 1.3 at 7/23/2020  6:43 AM  Age: 64 years " 11 months     - hepatology consulted  - PETH pending  - drinking in the past 2 weeks  - addiction psych consulted,- high risk  - U/S liver with doppler reviewed, no ascites  - transplant social work evaluation  - thiamine and folic acid   - not currently a liver transplant candidate and needs outpatient AA as well as likely SNF  - Would recommend palliative care evaluation at [facility] for goals of care and ever possible hospice set up , given poor prognosis, increased mortality, and lack of txp candidacy       On the day of d/c, patient was doing well with no acute issues. Mental status at baseline and feeling well . MELD 33, down from 36 (labs improving with ETOH abstinence) .  Transferred back to [facility]. Patient will require SNF. Would recommend palliative care evaluation at OSH for goals of care and ever possible hospice set up , given poor prognosis, increased mortality, and lack of txp candidacy       Other Medical Problems Addressed in the Hospital:    # KELLI  # ATN  # Metabolic acidosis   - Cr improving  - urine lytes and renal U/S reviewed  - nephrology following  - s/p bicarb drip  - Cr down trending to 3.2.  Fluid restriction liberated, given IVF. Low concern for HRS as per Nephrology      # Hypernatremia  - s/p  D5W, improved      # Acute hepatic encephalopathy   # Wernicke's Encephalopathy   - cont lactulose, rifaximin and zinc  - improving   - IV thiamine, lab sent      # Macrocytic anemia  # Thrombocytopenia  - 2/2 FPC  - monitor      # Moderate protein asael malnutrition  - PAB, boost, dietary      # Hypokalemia/Hypophosphatemia  - replaced     # Chronic pain syndrome   - restarted on methadone   - outpatient methadone issue will need to be addressed at OSH    CONSULTS: hepatology, nephrology, psychiatry     PROCEDURES: none    Labs:    Chemistries:   Recent Labs   Lab 07/21/20  0645 07/22/20  0632 07/22/20 2024 07/23/20  0643   * 147* 146* 144   K 2.8* 3.0* 3.7 3.5   CL  117* 118* 119* 116*   CO2 18* 20* 19* 19*   BUN 61* 49* 46* 43*   CREATININE 4.4* 3.7* 3.4* 3.2*   CALCIUM 9.2 9.0 8.7 9.0   PROT 6.2 6.1 5.8* 6.1   BILITOT 31.4* 26.8* 24.8* 26.2*   ALKPHOS 146* 146* 156* 150*   ALT 28 28 29 31   AST 68* 65* 68* 66*   MG 1.5* 2.1  --  1.5*   PHOS 1.6* 2.0*  --  2.4*        WBC:   Recent Labs   Lab 07/20/20  0442 07/21/20  0645 07/22/20  0632 07/23/20  0643   WBC 10.76 10.29 12.99* 15.62*     Bands:     CBC/Anemia Labs: Coags:    Recent Labs   Lab 07/21/20  0645 07/22/20  0632 07/23/20  0643   WBC 10.29 12.99* 15.62*   HGB 11.0* 10.9* 10.9*   HCT 32.7* 32.9* 33.9*   * 100* 103*   MCV 97 96 99*   RDW 18.6* 19.1* 19.6*    Recent Labs   Lab 07/21/20  0645 07/22/20  0632 07/23/20  0643   INR 1.3* 1.2 1.3*            Disposition:  Transfer back to referring [facility]      Future Scheduled Appointments:  No future appointments.    Follow-up Plans from This Hospitalization:      Discharge Medication List:       Nicholas Aiken   Home Medication Instructions LO:[id number]    Printed on:07/23/20 1432   Medication Information                      docusate sodium (COLACE) 100 MG capsule  Take 200 mg by mouth as directed.              folic acid (FOLVITE) 1 MG tablet  Take 1 tablet (1 mg total) by mouth once daily.             lactulose (CHRONULAC) 20 gram/30 mL Soln  Take 15 mLs (10 g total) by mouth 3 (three) times daily. TITRATE TO 3-4 BOWEL MOVEMENTS DAILY.             melatonin 3 mg Tab  Take 3 mg by mouth once daily. [facility] MELATONIN 3 MG TABS             methadone (DOLOPHINE) 10 MG tablet  Take 10 mg by mouth 2 (two) times a day. METHADONE HCL 10 MG TABS             pantoprazole (PROTONIX) 40 MG tablet  Take 1 tablet (40 mg total) by mouth once daily.             zinc sulfate (ZINCATE) 220 (50) mg capsule  Take 1 capsule (220 mg total) by mouth once daily.                   At the time of discharge patient was told to take all medications as prescribed, to keep all  followup appointments, and to call their primary care physician or return to the emergency room if they have any worsening or concerning symptoms.    Time spent on the discharge of the patient including review of hospital course with the patient. reviewing discharge medications and arranging follow-up care 45 minutes.  Patient was seen and examined on the date of discharge and determined to be suitable for discharge.        Signing Physician:  Cee Mendez MD

## 2020-07-23 NOTE — PLAN OF CARE
Report called to [facility]; going to bed 3214. VS stable on room air; he is afebrile and AAOx4 at time of discharge. Lactulose held prior to transport, but he has been having BMs continuously throughout the morning. 1.2L fluid restriction maintained. [facility] He was sent with personal belongings and on a waffle mattress. He was cleaned and barrier spray and cream applied immediately prior to leaving.

## 2020-07-23 NOTE — PROGRESS NOTES
Progress Note   Hospital Medicine         Patient Name: Nicholas Aiken  MRN:  [id number]  [facility] [place] [facility] [place] [facility] Jesus Thacker [place]  Date of Admission:  7/20/2020     Length of Stay:  LOS: 2 days   Expected Discharge Date: 7/24/2020  Principal Problem:  Acute liver failure       Subjective:     Interval History/Overnight Events:  Patient doing well today, mentation is much improved; tolerating a diet; Cr and LFTs improving; PT/OT state HH; sodium elevated, checking repeat CMP now; patient denies any pain currently; pulling issa; we were attempting to transfer patient back to [facility] as patient is not a liver transplant candidate, however there were no beds there; maybe able to be discharged home however in the next day or two if labs continue to improve and needs outpatient AA;      Review of Systems   Constitutional: Negative for chills, fatigue, fever.   HENT: Negative for sore throat, trouble swallowing.    Eyes: Negative for photophobia, visual disturbance.   Respiratory: Negative for cough, shortness of breath.    Cardiovascular: Negative for chest pain, palpitations, leg swelling.   Gastrointestinal: positive for abdominal pain, constipation, positive diarrhea, nausea, vomiting.   Endocrine: Negative for cold intolerance, heat intolerance.   Genitourinary: Negative for dysuria, frequency.   Musculoskeletal: Negative for arthralgias, myalgias.   Skin: Negative for rash, wound, erythema   Neurological: Negative for dizziness, syncope, weakness, light-headedness.   Psychiatric/Behavioral: Negative for confusion, hallucinations, anxiety  All other systems reviewed and are negative.    Objective:     Temp:  [97.8 °F (36.6 °C)-99.1 °F (37.3 °C)]   Pulse:  [64-74]   Resp:  [14-22]   BP: (142-168)/(81-98)   SpO2:  [95 %-98 %]       Physical Exam:  Constitutional: appears weak and ill  Head: Normocephalic and atraumatic.   Mouth/Throat: Oropharynx is clear and moist.   Eyes: EOM are normal. Pupils  are equal, round, and reactive to light. positive scleral icterus.   Neck: Normal range of motion. Neck supple.   Cardiovascular: Normal rate and regular rhythm.  No murmur heard.  Pulmonary/Chest: Effort normal and breath sounds normal. No respiratory distress. No wheezes, rales, or rhonchi  Abdominal: Soft. Bowel sounds are normal.  positive distension and tenderness  Musculoskeletal: Normal range of motion. No edema.   Neurological: Alert and oriented to person, place, and time.   Skin: Skin is warm and dry.   Psychiatric: Normal mood and affect. Behavior is normal.     Recent Labs   Lab 07/20/20  0442 07/21/20  0645 07/22/20  0632   WBC 10.76 10.29 12.99*   HGB 12.3* 11.0* 10.9*   HCT 36.3* 32.7* 32.9*   * 100* 100*     Recent Labs   Lab 07/20/20  0146 07/20/20  0953 07/20/20 1600 07/21/20  0645 07/22/20  0632    142 141 147* 147*   K 2.8*  2.9* 3.0* 3.4* 2.8* 3.0*   * 118* 117* 117* 118*   CO2 11* 12* 12* 18* 20*   BUN 77* 72* 69* 61* 49*   CREATININE 5.0* 4.7* 4.6* 4.4* 3.7*   GLU 78 70 82 89 106   CALCIUM 8.4* 8.7 8.8 9.2 9.0   MG 1.8  --   --  1.5* 2.1   PHOS 2.9 2.8  --  1.6* 2.0*     Recent Labs   Lab 07/20/20  0146  07/20/20 1600 07/21/20  0645 07/22/20  0632   ALKPHOS 181*  --  164* 146* 146*   ALT 34  --  33 28 28   AST 91*  --  87* 68* 65*   ALBUMIN 1.8*   < > 2.4* 2.5* 2.2*   PROT 6.3  --  6.4 6.2 6.1   BILITOT 33.8*  --  33.3* 31.4* 26.8*   INR 1.3*  --   --  1.3* 1.2    < > = values in this interval not displayed.     No results for input(s): POCTGLUCOSE in the last 168 hours.     folic acid  1 mg Oral Daily    heparin (porcine)  5,000 Units Subcutaneous Q12H    lactulose  10 g Oral TID    methadone  10 mg Oral Q12H    pantoprazole  40 mg Oral Daily    potassium, sodium phosphates  2 packet Oral QID (AC & HS)    rifAXIMin  550 mg Oral BID    thiamine (VITAMIN B1) IVPB  500 mg Intravenous TID    Followed by    [START ON 7/24/2020] thiamine (VITAMIN B1) IVPB  250 mg  Intravenous Daily    zinc sulfate  220 mg Oral Daily       Assessment and Plan     Mr. Nicholas Aiken is a 64 y.o. male who presented to [facility] on 7/20/2020 with     Hospital Course:    Mr. Nicholas Aiken was admitted to Hospital Medicine for management of     Active Hospital Problems    Diagnosis  POA    *Acute liver failure [K72.00]  Yes    Moderate malnutrition [E44.0]  Yes     Recommendations     1. Continue low sodium diet with Novasource ONS.  Goals: 1. Pt's intake meals >75% by RD follow up.  Nutrition Goal Status: new  Communication of RD Recs: (POC)    Assessment and Plan     Nutrition Problem:  Moderate Protein-Calorie Malnutrition  Malnutrition in the context of Chronic Illness     Related to (etiology):  Alcoholism, liver failure     Signs and Symptoms (as evidenced by):  Energy Intake: <50% of estimated energy requirement for 1+ months  Body Fat Depletion: moderate depletion of orbitals, triceps   Muscle Mass Depletion: mild to moderate depletion of temples, clavicles, shoulders, hands, LEs  Weight Loss: predicted 6% x several weeks   Fluid accumulation: moderate ascites, mild edema to arms     Interventions(treatment strategy):  Collaboration of care with providers  Commercial beverage: Novasource ONS TID     Nutrition Diagnosis Status:  New      Encounter for pre-transplant evaluation for liver transplant [Z01.818]  Not Applicable    Alcohol use disorder, severe, in controlled environment [F10.20]  Yes    ATN (acute tubular necrosis) [N17.0]  Yes    Thrombocytopenia [D69.6]  Yes    Alcoholic hepatitis without ascites [K70.10]  Yes    Metabolic acidemia [E87.2]  Yes    Encephalopathy, metabolic [G93.41]  Yes    Narcotic abuse [F11.10]  Yes    KELLI (acute kidney injury) [N17.9]  Yes    Metabolic acidosis [E87.2]  Yes    Acute renal failure [N17.9]  Yes    Macrocytic anemia [D53.9]  Yes    Tobacco abuse [Z72.0]  Yes    Dyslipidemia [E78.5]  Yes    Decompensated cirrhosis related to  hepatitis C virus (HCV) [B19.20, K74.69]  Yes    Carrier of viral hepatitis C [B18.2]  Not Applicable    Chronic pain associated with significant psychosocial dysfunction [G89.4]  Yes      Resolved Hospital Problems   No resolved problems to display.     # Acute on chronic liver failure without coma   # Alcohol hepatitis without ascites  # Decompensated Hep C / Alcohol cirrhosis without ascites  # Severe alcohol dependence  MELD-Na score: 33 at 7/22/2020  6:32 AM  MELD score: 33 at 7/22/2020  6:32 AM  Calculated from:  Serum Creatinine: 3.7 mg/dL at 7/22/2020  6:32 AM  Serum Sodium: 147 mmol/L (Rounded to 137 mmol/L) at 7/22/2020  6:32 AM  Total Bilirubin: 26.8 mg/dL at 7/22/2020  6:32 AM  INR(ratio): 1.2 at 7/22/2020  6:32 AM  Age: 64 years 11 months  - hepatology consulted  - PETH pending  - drinking in the past 2 weeks  - addiction psych consulted, states high risk  - U/S liver with doppler reviewed, no ascites  - transplant social work evaluation  - thiamine and folic acid   - not currently a liver transplant candidate and needs outpatient AA    # KELLI  # ATN  # Metabolic acidosis   - Cr improving  - urine lytes and renal U/S reviewed  - nephrology following  - s/p bicarb drip    # Hypernatremia  - 147 today, re-checking CMP, if still elevated, will give D5    # Acute hepatic encephalopathy   # Wernicke's Encephalopathy   - cont lactulose, rifaximin and zinc  - improving   - IV thiamine, lab sent     # Macrocytic anemia  # Thrombocytopenia  - folic acid;   - iron panel    # Moderate protein asael malnutrition  - PAB, boost, dietary     # Hypokalemia/Hypophosphatemia  - replace     # Chronic pain syndrome   - restarted on methadone     Diet:  Low sodium  GI PPx:    DVT PPx:    Goals of Care:  full    High Risk Conditions:  Acute liver failure     Disposition:  In the next day or two home with HH and outpatient AA    Jesus Thacker MD  Medical Director [facility] Medicine  Spectra:  [phone]  Pager:  [phone]

## 2020-07-23 NOTE — PLAN OF CARE
[facility]    FACILITY TRANSFER ORDERS      Patient Name: Nicholas Aiken  YOB: 1955    PCP: Primary Doctor No   PCP Address: None  PCP Phone Number: None  PCP Fax: None    Encounter Date: 07/23/2020    Admit to:  [facility]     Vital Signs:  Routine    Diagnoses:   Active Hospital Problems    Diagnosis  POA    *Acute liver failure [K72.00]  Yes    Moderate malnutrition [E44.0]  Yes    Encounter for pre-transplant evaluation for liver transplant [Z01.818]  Not Applicable    Alcohol use disorder, severe, in controlled environment [F10.20]  Yes    ATN (acute tubular necrosis) [N17.0]  Yes    Thrombocytopenia [D69.6]  Yes    Alcoholic hepatitis without ascites [K70.10]  Yes    Metabolic acidemia [E87.2]  Yes    Encephalopathy, metabolic [G93.41]  Yes    Narcotic abuse [F11.10]  Yes    KELLI (acute kidney injury) [N17.9]  Yes    Metabolic acidosis [E87.2]  Yes    Acute renal failure [N17.9]  Yes    Macrocytic anemia [D53.9]  Yes    Tobacco abuse [Z72.0]  Yes    Dyslipidemia [E78.5]  Yes    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       Allergies:Review of patient's allergies indicates:  No Known Allergies    Diet: 2 gram sodium diet    Activities: Activity as tolerated    Nursing: per facility protocol      Labs: CBC, CMP and INR Daily for 5 days     CONSULTS:    Physical Therapy to evaluate and treat.  and Occupational Therapy to evaluate and treat.    MISCELLANEOUS CARE:  n/a    WOUND CARE ORDERS  n/a    Medications: Review discharge medications with patient and family and provide education.      Current Discharge Medication List      START taking these medications    Details   folic acid (FOLVITE) 1 MG tablet Take 1 tablet  (1 mg total) by mouth once daily.  Refills: 0      lactulose (CHRONULAC) 20 gram/30 mL Soln Take 15 mLs (10 g total) by mouth 3 (three) times daily. TITRATE TO 3-4 BOWEL MOVEMENTS DAILY.  Qty: 450 mL, Refills: 0      zinc sulfate (ZINCATE) 220 (50) mg capsule Take 1 capsule (220 mg total) by mouth once daily.  Qty:           CONTINUE these medications which have NOT CHANGED    Details   docusate sodium (COLACE) 100 MG capsule Take 200 mg by mouth as directed.       melatonin 3 mg Tab Take 3 mg by mouth once daily. [facility]S MELATONIN 3 MG TABS      methadone (DOLOPHINE) 10 MG tablet Take 10 mg by mouth 2 (two) times a day. METHADONE HCL 10 MG TABS      pantoprazole (PROTONIX) 40 MG tablet Take 1 tablet (40 mg total) by mouth once daily.  Qty: 30 tablet, Refills: 0         STOP taking these medications       baclofen (LIORESAL) 20 MG tablet Comments:   Reason for Stopping:         doxepin (SINEQUAN) 10 MG capsule Comments:   Reason for Stopping:         fenofibrate 160 MG Tab Comments:   Reason for Stopping:         hydrocodone-acetaminophen 10-325mg (NORCO)  mg Tab Comments:   Reason for Stopping:         lisinopril (PRINIVIL,ZESTRIL) 40 MG tablet Comments:   Reason for Stopping:         niacin (NIASPAN) 750 mg TbSR Comments:   Reason for Stopping:         potassium 99 mg Tab Comments:   Reason for Stopping:         propranolol (INDERAL) 20 MG tablet Comments:   Reason for Stopping:         propranolol (INDERAL) 20 MG tablet Comments:   Reason for Stopping:         propranolol (INDERAL) 20 MG tablet Comments:   Reason for Stopping:         spironolactone (ALDACTONE) 50 MG tablet Comments:   Reason for Stopping:         testosterone cypionate (DEPOTESTOTERONE CYPIONATE) 200 mg/mL injection Comments:   Reason for Stopping:                    _________________________________  Cee Mendez MD  07/23/2020

## 2020-07-23 NOTE — PLAN OF CARE
SW notified that patient is accepted to transfer back to [facility] today. Transportation is being coordinated by the [facility].        07/23/20 0223   Final Note   Assessment Type Final Discharge Note   Anticipated Discharge Disposition Admitted   Post-Acute Status   Post-Acute Authorization Placement   Post-Acute Placement Status Set-up Complete   Discharge Delays None known at this time     Chantelle Rae LMSW   - Case Management

## 2020-07-23 NOTE — PROGRESS NOTES
[facility]  Nephrology  Progress Note    Patient Name: Nicholas Aiken  MRN: [id number]  Admission Date: 7/20/2020  Hospital Length of Stay: 3 days  Attending Provider: Cee Mendez MD   Primary Care Physician: Primary Doctor No  Principal Problem:Acute liver failure    Subjective:     HPI: Nicholas Aiken is a 64 year old man with alcoholic and HCV cirrhosis s/p Harvoni, alcohol abuse (last drink per mother 3 weeks ago), gastric varices, history of narcotic abuse on methadone, tobacco use, OA, HTN, anxiety, and hypogonadism who presented as a transfer from the [facility] ED in [place], [place] for diarrhea, worsening hepatic encephalopathy, jaundice, and KELLI. History is obtained mostly from chart review as patient is not a good historian. Patient does not have a history of CKD and Cr baseline is 0.9-1.2. On arrival to [facility] ED, labs were significant for BUN 94, Cr 7.18, bicarb 15, T bili 40, ammonia 212, K 2.2. U/S did not show significant ascites. He was given 2L IVF, lactulose, and rifaximin. Repeat Cr after fluids improved to 5.91. Continued to have multiple BMs and an episode of nonbloody emesis. MELD remained in the 30s and patient was transferred to [facility] for a higher level of care/hepatology. On arrival to [facility] he received another 1L IVF and was started on ceftriaxone for SBP prophylaxis and albumin 25 mg TID. Continued to hold home lisinopril, spironolactone, and propranolol due to KELLI and suspected hypovolemia. Bicarb continued to drop to 11. Nephrology was consulted for KELLI.     Interval History: Feeling good today. Worked with PT yesterday and spent a large portion of the day in the chair which he liked. He does admit to being thirsty with the fluid restriction and states that he does not restrict fluids at home. Good UOP with 3L urine yesterday.    Review of patient's allergies indicates:  No Known Allergies  Current Facility-Administered Medications   Medication Frequency    acetaminophen  tablet 650 mg Q8H PRN    albuterol-ipratropium 2.5 mg-0.5 mg/3 mL nebulizer solution 3 mL Q6H PRN    dextrose 50% injection 12.5 g PRN    dextrose 50% injection 25 g PRN    folic acid tablet 1 mg Daily    glucagon (human recombinant) injection 1 mg PRN    glucose chewable tablet 16 g PRN    glucose chewable tablet 24 g PRN    heparin (porcine) injection 5,000 Units Q12H    lactulose 20 gram/30 mL solution Soln 10 g TID    melatonin tablet 6 mg Nightly PRN    methadone tablet 10 mg Q12H    ondansetron disintegrating tablet 8 mg Q8H PRN    pantoprazole EC tablet 40 mg Daily    prochlorperazine injection Soln 5 mg Q6H PRN    rifAXIMin tablet 550 mg BID    sodium chloride 0.9% flush 10 mL PRN    thiamine (B-1) 500 mg in dextrose 5 % 50 mL IVPB TID    Followed by    [START ON 7/24/2020] thiamine (B-1) 250 mg in dextrose 5 % 50 mL IVPB Daily    zinc sulfate capsule 220 mg Daily       Objective:     Vital Signs (Most Recent):  Temp: 98.2 °F (36.8 °C) (07/23/20 0830)  Pulse: 88 (07/23/20 0830)  Resp: 18 (07/23/20 0900)  BP: (!) 158/68 (07/23/20 0830)  SpO2: 98 % (07/23/20 0830)  O2 Device (Oxygen Therapy): room air (07/22/20 2347) Vital Signs (24h Range):  Temp:  [98.2 °F (36.8 °C)-99.1 °F (37.3 °C)] 98.2 °F (36.8 °C)  Pulse:  [64-88] 88  Resp:  [7-25] 18  SpO2:  [97 %-98 %] 98 %  BP: (137-168)/(68-94) 158/68     Weight: 94 kg (207 lb 4.8 oz) (07/21/20 0400)  Body mass index is 31.52 kg/m².  Body surface area is 2.12 meters squared.    I/O last 3 completed shifts:  In: 2050 [P.O.:1200; I.V.:750; IV Piggyback:100]  Out: 4950 [Urine:3750; Stool:1200]    Physical Exam  Vitals signs reviewed.   Constitutional:       General: He is not in acute distress.     Appearance: He is well-developed. He is not diaphoretic.   HENT:      Head: Normocephalic and atraumatic.      Mouth/Throat:      Mouth: Mucous membranes are dry.   Eyes:      General: Scleral icterus present.      Conjunctiva/sclera: Conjunctivae normal.    Neck:      Musculoskeletal: Normal range of motion.      Trachea: No tracheal deviation.      Comments: No JVD.  Cardiovascular:      Rate and Rhythm: Normal rate and regular rhythm.      Heart sounds: Normal heart sounds. No murmur.   Pulmonary:      Effort: Pulmonary effort is normal. No respiratory distress.      Breath sounds: Normal breath sounds. No wheezing or rales.   Abdominal:      General: Bowel sounds are normal. There is distension.      Palpations: Abdomen is soft.      Tenderness: There is no abdominal tenderness.   Musculoskeletal:         General: No deformity.      Comments: Trace pretibial pitting edema bilaterally.   Skin:     General: Skin is warm and dry.      Coloration: Skin is jaundiced.      Findings: No erythema or rash.      Comments: Venous stasis dermatitis in bilateral lower extremities.   Neurological:      Mental Status: He is alert.      Cranial Nerves: No cranial nerve deficit.      Sensory: No sensory deficit.      Motor: No abnormal muscle tone.      Comments: A&Ox4. Answering questions appropriately. Action tremor and bilateral asterixis still present, however amplitude continues to improve.         Significant Labs:  CMP:   Recent Labs   Lab 07/23/20  0643   GLU 92   CALCIUM 9.0   ALBUMIN 2.2*   PROT 6.1      K 3.5   CO2 19*   *   BUN 43*   CREATININE 3.2*   ALKPHOS 150*   ALT 31   AST 66*   BILITOT 26.2*     Recent Labs   Lab 07/20/20  1241   COLORU Chelsy   SPECGRAV 1.010   PHUR 6.0   PROTEINUA Negative   NITRITE Negative   LEUKOCYTESUR Negative     All labs within the past 24 hours have been reviewed.     Significant Imaging:  Labs: Reviewed  US: Reviewed    Assessment/Plan:     KELLI (acute kidney injury)  Metabolic acidosis  Hypokalemia  Hypomagnesemia  Hypophosphatemia  This patient presented with hepatic encephalopathy and 4 days of diarrhea. Also presumably poor oral intake at home considering current debility while inpatient, lives at home alone. RENETTA buchanan  aside from high bilirubin. Has been hemodynamically stable (hypertensive) and renal function improving with IV fluids, making HRS very unlikely. This all points towards a pre-renal etiology. Continued to be acidotic with bicarb 11, pH 7.31. Retroperitoneal U/S WNL. Patient has multiple reasons to have an anion gap (corrected 20.5) metabolic acidosis. Uremia from renal dysfunction and lactic acid accumulation from liver dysfunction can contribute to the gap. Acidosis is further worsened from GI losses. After 1 day of IV bicarb, then D5/1/2NS with KCL 40 mEq and sodium bicarb 150 mEq, patient's acidosis improved. Urine microscopy on 7/21 showed scant hyaline casts but overall bland. This along with his rapid improvement in renal function with fluids is consistent with a prerenal etiology, not ATN. On 7/21 we stopped D5/1/2NS and replaced with NS due to new hypernatremia with Na 147, however the next day sodium was unchanged. Free water deficit at that time was 2.4L ans still on fluid restriction of 1.2L per day. We recommended liberalizing the fluid restriction however this was not done. Na down to 144 on 7/23.    - Free water deficit 1.6L today. Would recommend relaxing fluid restriction, especially since his urine output is significant (3L UOP yesterday).  - Creatinine continues to improve and patient is consistently urinating appropriately  - Strict ins and outs and daily weights  - Titrate lactulose to 3-4 BM per day as excessive diarrhea will slow the improvement of his KELLI which we feel is pre-renal.  - The patient does not have any current indications for urgent HD, especially now that acidosis has improved  - Avoid nephrotoxic agents  - Renally dose medications to current CrCl      Thank you for your consult. I will follow-up with patient. Please contact us if you have any additional questions.    Robbi Borges, DO   Internal Medicine, PGY-II  Nephrology  [facility]

## 2020-07-23 NOTE — ASSESSMENT & PLAN NOTE
Metabolic acidosis  Hypokalemia  Hypomagnesemia  Hypophosphatemia  This patient presented with hepatic encephalopathy and 4 days of diarrhea. Also presumably poor oral intake at home considering current debility while inpatient, lives at home alone. UA bland aside from high bilirubin. Has been hemodynamically stable (hypertensive) and renal function improving with IV fluids, making HRS very unlikely. This all points towards a pre-renal etiology. Continued to be acidotic with bicarb 11, pH 7.31. Retroperitoneal U/S WNL. Patient has multiple reasons to have an anion gap (corrected 20.5) metabolic acidosis. Uremia from renal dysfunction and lactic acid accumulation from liver dysfunction can contribute to the gap. Acidosis is further worsened from GI losses. After 1 day of IV bicarb, then D5/1/2NS with KCL 40 mEq and sodium bicarb 150 mEq, patient's acidosis improved. Urine microscopy on 7/21 showed scant hyaline casts but overall bland. This along with his rapid improvement in renal function with fluids is consistent with a prerenal etiology, not ATN. On 7/21 we stopped D5/1/2NS and replaced with NS due to new hypernatremia with Na 147, however the next day sodium was unchanged. Free water deficit at that time was 2.4L ans still on fluid restriction of 1.2L per day. We recommended liberalizing the fluid restriction however this was not done. Na down to 144 on 7/23.    - Free water deficit 1.6L today. Would recommend relaxing fluid restriction, especially since his urine output is significant (3L UOP yesterday).  - Creatinine continues to improve and patient is consistently urinating appropriately  - Strict ins and outs and daily weights  - Titrate lactulose to 3-4 BM per day as excessive diarrhea will slow the improvement of his KELLI which we feel is pre-renal.  - The patient does not have any current indications for urgent HD, especially now that acidosis has improved  - Avoid nephrotoxic agents  - Renally dose  medications to current CrCl

## 2020-07-24 LAB
HCV RNA SERPL NAA+PROBE-LOG IU: <1.08 LOG (10) IU/ML
HCV RNA SERPL QL NAA+PROBE: NOT DETECTED IU/ML
HCV RNA SPEC NAA+PROBE-ACNC: <12 IU/ML
MITOCHONDRIA AB TITR SER IF: NORMAL {TITER}
PHOSPHATIDYLETHANOL (PETH): 174 NG/ML
SMOOTH MUSCLE AB TITR SER IF: NORMAL {TITER}

## 2020-07-26 LAB
BACTERIA BLD CULT: NORMAL
BACTERIA BLD CULT: NORMAL

## 2020-07-27 LAB — VIT B1 BLD-MCNC: 31 UG/L (ref 38–122)

## 2020-07-28 LAB
ANA PATTERN 1: NORMAL
ANA SER QL IF: POSITIVE
ANA TITR SER IF: NORMAL {TITER}

## 2020-07-31 LAB
ANTI SM ANTIBODY: 0.12 RATIO (ref 0–0.99)
ANTI SM/RNP ANTIBODY: 0.17 RATIO (ref 0–0.99)
ANTI-SM INTERPRETATION: NEGATIVE
ANTI-SM/RNP INTERPRETATION: NEGATIVE
ANTI-SSA ANTIBODY: 0.13 RATIO (ref 0–0.99)
ANTI-SSA INTERPRETATION: NEGATIVE
ANTI-SSB ANTIBODY: 0.09 RATIO (ref 0–0.99)
ANTI-SSB INTERPRETATION: NEGATIVE
DSDNA AB SER-ACNC: NORMAL [IU]/ML

## 2020-11-03 NOTE — CONSULTS
[facility]  Nephrology  Consult Note    Patient Name: Nicholas Aiken  MRN: [id number]  Admission Date: 7/20/2020  Hospital Length of Stay: 0 days  Attending Provider: Jesus Thacker MD   Primary Care Physician: Primary Doctor No  Principal Problem:Acute liver failure    Inpatient consult to Nephrology  Consult performed by: Robbi Borges DO  Consult ordered by: Jesus Thacker MD        Subjective:     HPI: Nicholas Aiken is a 64 year old man with alcoholic and HCV cirrhosis s/p Harvoni, alcohol abuse (last drink per mother 3 weeks ago), gastric varices, history of narcotic abuse on methadone, tobacco use, OA, HTN, anxiety, and hypogonadism who presented as a transfer from the [place] ED in [place], [place] for diarrhea, worsening hepatic encephalopathy, jaundice, and KELLI. History is obtained mostly from chart review as patient is not a good historian. Patient does not have a history of CKD and Cr baseline is 0.9-1.2. On arrival to [facility] ED, labs were significant for BUN 94, Cr 7.18, bicarb 15, T bili 40, ammonia 212, K 2.2. U/S did not show significant ascites. He was given 2L IVF, lactulose, and rifaximin. Repeat Cr after fluids improved to 5.91. Continued to have multiple BMs and an episode of nonbloody emesis. MELD remained in the 30s and patient was transferred to [facility] for a higher level of care/hepatology. On arrival to [facility] he received another 1L IVF and was started on ceftriaxone for SBP prophylaxis and albumin 25 mg TID. Continued to hold home lisinopril, spironolactone, and propranolol due to KELLI and suspected hypovolemia. Bicarb continued to drop to 11. Nephrology was consulted for KELLI.     Past Medical History:   Diagnosis Date    Anxiety     Hepatitis C     Hypertension     Testosterone deficiency        Past Surgical History:   Procedure Laterality Date    ear drum replaced      fisure      rgd         Review of patient's allergies indicates:  No Known Allergies  Current  Facility-Administered Medications   Medication Frequency    acetaminophen tablet 650 mg Q8H PRN    albumin human 25% bottle 25 g TID    albuterol-ipratropium 2.5 mg-0.5 mg/3 mL nebulizer solution 3 mL Q6H PRN    cefTRIAXone 2 gram/50 mL IVPB 2 g Q24H    [START ON 7/21/2020] dextrose 5 % and 0.45 % NaCl 1,000 mL with potassium chloride 40 mEq, sodium bicarbonate 150 mEq infusion Continuous    dextrose 50% injection 12.5 g PRN    dextrose 50% injection 25 g PRN    glucagon (human recombinant) injection 1 mg PRN    glucose chewable tablet 16 g PRN    glucose chewable tablet 24 g PRN    lactulose 20 gram/30 mL solution Soln 45 g TID    LORazepam tablet 1 mg Q4H PRN    melatonin tablet 6 mg Nightly PRN    ondansetron disintegrating tablet 8 mg Q8H PRN    pantoprazole EC tablet 40 mg Daily    prochlorperazine injection Soln 5 mg Q6H PRN    rifAXIMin tablet 550 mg BID    sodium bicarbonate 150 mEq in dextrose 5 % 1,000 mL infusion Continuous    sodium chloride 0.9% flush 10 mL PRN     Family History     Problem Relation (Age of Onset)    Arthritis Mother, Father    Diabetes Father    Hyperlipidemia Father    Hypertension Father    Stroke Father        Tobacco Use    Smoking status: Current Every Day Smoker     Types: Cigarettes    Smokeless tobacco: Never Used   Substance and Sexual Activity    Alcohol use: Yes    Drug use: No    Sexual activity: Not on file     Review of Systems   Constitutional: Positive for fatigue. Negative for chills and fever.   HENT: Negative for congestion, ear pain, sinus pain and sore throat.    Eyes: Negative for visual disturbance.   Respiratory: Negative for cough and shortness of breath.    Cardiovascular: Negative for chest pain and leg swelling.   Gastrointestinal: Positive for diarrhea. Negative for abdominal pain, constipation, nausea and vomiting.   Genitourinary: Negative for decreased urine volume, difficulty urinating and dysuria.   Musculoskeletal: Negative  for back pain and neck pain.   Skin: Positive for color change. Negative for rash.   Neurological: Negative for dizziness, syncope and headaches.   Psychiatric/Behavioral: Negative for confusion and decreased concentration.     Objective:     Vital Signs (Most Recent):  Temp: 98.3 °F (36.8 °C) (07/20/20 1140)  Pulse: 83 (07/20/20 1537)  Resp: 18 (07/20/20 1537)  BP: (!) 159/83 (07/20/20 1537)  SpO2: 98 % (07/20/20 1537)  O2 Device (Oxygen Therapy): room air (07/20/20 1537) Vital Signs (24h Range):  Temp:  [96.1 °F (35.6 °C)-98.3 °F (36.8 °C)] 98.3 °F (36.8 °C)  Pulse:  [73-90] 83  Resp:  [18-22] 18  SpO2:  [97 %-99 %] 98 %  BP: (131-173)/(77-90) 159/83     Weight: 94.6 kg (208 lb 8.9 oz) (07/20/20 0154)  Body mass index is 31.71 kg/m².  Body surface area is 2.13 meters squared.    I/O last 3 completed shifts:  In: 50 [IV Piggyback:50]  Out: -     Physical Exam  Vitals signs reviewed.   Constitutional:       General: He is not in acute distress.     Appearance: He is well-developed. He is not diaphoretic.   HENT:      Head: Normocephalic and atraumatic.      Mouth/Throat:      Mouth: Mucous membranes are dry.   Eyes:      General: Scleral icterus present.      Conjunctiva/sclera: Conjunctivae normal.   Neck:      Musculoskeletal: Normal range of motion.      Trachea: No tracheal deviation.      Comments: No JVD.  Cardiovascular:      Rate and Rhythm: Normal rate and regular rhythm.      Heart sounds: Normal heart sounds. No murmur.   Pulmonary:      Effort: Pulmonary effort is normal. No respiratory distress.      Breath sounds: Normal breath sounds. No wheezing or rales.   Abdominal:      General: Bowel sounds are normal. There is distension.      Palpations: Abdomen is soft.      Tenderness: There is no abdominal tenderness.   Musculoskeletal:         General: No deformity.      Comments: Trace pretibial pitting edema bilaterally.   Skin:     General: Skin is warm and dry.      Coloration: Skin is jaundiced.       Findings: No erythema or rash.      Comments: Venous stasis dermatitis in bilateral lower extremities.   Neurological:      Mental Status: He is alert.      Cranial Nerves: No cranial nerve deficit.      Sensory: No sensory deficit.      Motor: No abnormal muscle tone.      Comments: Oriented to self, place, situation, and month/year however not aware of current date. Delayed responses to questions and at times requires the question to be asked multiple times before a reply is given. Bilateral asterixis is present.         Significant Labs:  CMP:   Recent Labs   Lab 07/20/20  0146 07/20/20  0953   GLU 78 70   CALCIUM 8.4* 8.7   ALBUMIN 1.8* 1.8*   PROT 6.3  --     142   K 2.8*  2.9* 3.0*   CO2 11* 12*   * 118*   BUN 77* 72*   CREATININE 5.0* 4.7*   ALKPHOS 181*  --    ALT 34  --    AST 91*  --    BILITOT 33.8*  --      Recent Labs   Lab 07/20/20  1241   COLORU Chelsy   SPECGRAV 1.010   PHUR 6.0   PROTEINUA Negative   NITRITE Negative   LEUKOCYTESUR Negative     All labs within the past 24 hours have been reviewed.    Significant Imaging:  Labs: Reviewed  US: Reviewed    Assessment/Plan:     KELLI (acute kidney injury)  Metabolic acidosis  This patient presented with hepatic encephalopathy and 4 days of diarrhea. Also presumably poor oral intake at home considering current debility while inpatient, lives at home alone. UA bland aside from high bilirubin. Has been hemodynamically stable (hypertensive) and renal function improving with IV fluids, making HRS very unlikely. This all points towards a pre-renal etiology, with possible ATN. Continued to be acidotic with bicarb 11, pH 7.31. Retroperitoneal U/S WNL. Patient has multiple reasons to have an anion gap (corrected 20.5) metabolic acidosis. Uremia from renal dysfunction and lactic acid accumulation from liver dysfunction can contribute to the gap. Acidosis is further worsened from GI losses.    - Agree with replacing bicarbonate IV, however would  recommend switching to D5/1/2NS with KCL 40 mEq and sodium bicarb 150 mEq. As we administer more bicarb the potassium will shift intracellularly, and his hypokalemia will worsen. Would recommend placing the patient on cardiac telemetry at least for he next day while K is being replaced. Also D5/1/2NS will be ideal for volume repletion.  - Would recommend checking BMPs q4 until bicarb and K stabilize  - Strict ins and outs. Would also recommend placing Gonzalez catheter at least for the next day to more accurately measure ins and outs as they are not currently being measured  - The patient does not have any current indications for urgent HD, however if he continues to be acidotic then we will consider this.  - Will follow up urine lytes  - Will spin urine  - Avoid nephrotoxic agents  - Renally dose medications to current CrCl      Thank you for your consult. I will follow-up with patient. Please contact us if you have any additional questions.    Robbi Borges, DO   Internal Medicine, PGY-II  Nephrology  [facility]   never